# Patient Record
Sex: MALE | Race: BLACK OR AFRICAN AMERICAN | NOT HISPANIC OR LATINO | Employment: FULL TIME | URBAN - METROPOLITAN AREA
[De-identification: names, ages, dates, MRNs, and addresses within clinical notes are randomized per-mention and may not be internally consistent; named-entity substitution may affect disease eponyms.]

---

## 2018-02-17 ENCOUNTER — HOSPITAL ENCOUNTER (EMERGENCY)
Facility: HOSPITAL | Age: 43
Discharge: HOME | End: 2018-02-17
Attending: EMERGENCY MEDICINE

## 2018-02-17 VITALS
RESPIRATION RATE: 18 BRPM | HEART RATE: 92 BPM | OXYGEN SATURATION: 99 % | DIASTOLIC BLOOD PRESSURE: 104 MMHG | SYSTOLIC BLOOD PRESSURE: 142 MMHG | WEIGHT: 170 LBS | HEIGHT: 68 IN | TEMPERATURE: 99 F | BODY MASS INDEX: 25.76 KG/M2

## 2018-02-17 DIAGNOSIS — B02.9 HERPES ZOSTER WITHOUT COMPLICATION: Primary | ICD-10-CM

## 2018-02-17 DIAGNOSIS — I10 UNCONTROLLED HYPERTENSION: ICD-10-CM

## 2018-02-17 LAB
ANION GAP SERPL CALC-SCNC: 9 MMOL/L
BUN SERPL-MCNC: 10 MG/DL
CALCIUM SERPL-MCNC: 9.7 MG/DL
CHLORIDE SERPL-SCNC: 100 MMOL/L
CO2 SERPL-SCNC: 29 MMOL/L
CREAT SERPL-MCNC: 1.1 MG/DL
EST. GFR  (AFRICAN AMERICAN): >60 ML/MIN/1.73 M^2
EST. GFR  (NON AFRICAN AMERICAN): >60 ML/MIN/1.73 M^2
GLUCOSE SERPL-MCNC: 123 MG/DL
POTASSIUM SERPL-SCNC: 3.4 MMOL/L
SODIUM SERPL-SCNC: 138 MMOL/L

## 2018-02-17 PROCEDURE — 99284 EMERGENCY DEPT VISIT MOD MDM: CPT | Mod: 25

## 2018-02-17 PROCEDURE — 96375 TX/PRO/DX INJ NEW DRUG ADDON: CPT

## 2018-02-17 PROCEDURE — 96376 TX/PRO/DX INJ SAME DRUG ADON: CPT

## 2018-02-17 PROCEDURE — 96374 THER/PROPH/DIAG INJ IV PUSH: CPT

## 2018-02-17 PROCEDURE — 25000003 PHARM REV CODE 250: Performed by: EMERGENCY MEDICINE

## 2018-02-17 PROCEDURE — 80048 BASIC METABOLIC PNL TOTAL CA: CPT

## 2018-02-17 PROCEDURE — 63600175 PHARM REV CODE 636 W HCPCS: Performed by: EMERGENCY MEDICINE

## 2018-02-17 RX ORDER — AMLODIPINE BESYLATE 5 MG/1
10 TABLET ORAL DAILY
Qty: 30 TABLET | Refills: 1 | Status: ON HOLD | OUTPATIENT
Start: 2018-02-17 | End: 2021-03-03 | Stop reason: HOSPADM

## 2018-02-17 RX ORDER — ACYCLOVIR 800 MG/1
800 TABLET ORAL
Qty: 35 TABLET | Refills: 0 | Status: ON HOLD | OUTPATIENT
Start: 2018-02-17 | End: 2021-03-03 | Stop reason: HOSPADM

## 2018-02-17 RX ORDER — HYDRALAZINE HYDROCHLORIDE 20 MG/ML
20 INJECTION INTRAMUSCULAR; INTRAVENOUS
Status: DISCONTINUED | OUTPATIENT
Start: 2018-02-17 | End: 2018-02-17 | Stop reason: HOSPADM

## 2018-02-17 RX ORDER — HYDRALAZINE HYDROCHLORIDE 20 MG/ML
20 INJECTION INTRAMUSCULAR; INTRAVENOUS
Status: COMPLETED | OUTPATIENT
Start: 2018-02-17 | End: 2018-02-17

## 2018-02-17 RX ORDER — VALACYCLOVIR HYDROCHLORIDE 500 MG/1
1000 TABLET, FILM COATED ORAL 2 TIMES DAILY
Status: DISCONTINUED | OUTPATIENT
Start: 2018-02-17 | End: 2018-02-17 | Stop reason: HOSPADM

## 2018-02-17 RX ORDER — AMLODIPINE BESYLATE 5 MG/1
10 TABLET ORAL
Status: COMPLETED | OUTPATIENT
Start: 2018-02-17 | End: 2018-02-17

## 2018-02-17 RX ORDER — VALACYCLOVIR HYDROCHLORIDE 500 MG/1
1000 TABLET, FILM COATED ORAL 2 TIMES DAILY
Status: DISCONTINUED | OUTPATIENT
Start: 2018-02-17 | End: 2018-02-17

## 2018-02-17 RX ORDER — HYDROCODONE BITARTRATE AND ACETAMINOPHEN 5; 325 MG/1; MG/1
1-2 TABLET ORAL EVERY 4 HOURS PRN
Qty: 20 TABLET | Refills: 0 | Status: SHIPPED | OUTPATIENT
Start: 2018-02-17 | End: 2018-02-27

## 2018-02-17 RX ORDER — LABETALOL HYDROCHLORIDE 5 MG/ML
40 INJECTION, SOLUTION INTRAVENOUS
Status: DISCONTINUED | OUTPATIENT
Start: 2018-02-17 | End: 2018-02-17 | Stop reason: HOSPADM

## 2018-02-17 RX ORDER — HYDROMORPHONE HYDROCHLORIDE 2 MG/ML
1 INJECTION, SOLUTION INTRAMUSCULAR; INTRAVENOUS; SUBCUTANEOUS
Status: COMPLETED | OUTPATIENT
Start: 2018-02-17 | End: 2018-02-17

## 2018-02-17 RX ORDER — LABETALOL HYDROCHLORIDE 5 MG/ML
20 INJECTION, SOLUTION INTRAVENOUS
Status: COMPLETED | OUTPATIENT
Start: 2018-02-17 | End: 2018-02-17

## 2018-02-17 RX ORDER — LABETALOL HYDROCHLORIDE 5 MG/ML
20 INJECTION, SOLUTION INTRAVENOUS
Status: DISCONTINUED | OUTPATIENT
Start: 2018-02-17 | End: 2018-02-17

## 2018-02-17 RX ORDER — METOPROLOL TARTRATE 50 MG/1
50 TABLET ORAL
Status: COMPLETED | OUTPATIENT
Start: 2018-02-17 | End: 2018-02-17

## 2018-02-17 RX ORDER — METOPROLOL TARTRATE 100 MG/1
50 TABLET ORAL 2 TIMES DAILY
Qty: 60 TABLET | Refills: 1 | Status: ON HOLD | OUTPATIENT
Start: 2018-02-17 | End: 2021-03-03 | Stop reason: HOSPADM

## 2018-02-17 RX ORDER — HYDRALAZINE HYDROCHLORIDE 20 MG/ML
10 INJECTION INTRAMUSCULAR; INTRAVENOUS
Status: COMPLETED | OUTPATIENT
Start: 2018-02-17 | End: 2018-02-17

## 2018-02-17 RX ORDER — ONDANSETRON 4 MG/1
4 TABLET, ORALLY DISINTEGRATING ORAL
Status: COMPLETED | OUTPATIENT
Start: 2018-02-17 | End: 2018-02-17

## 2018-02-17 RX ADMIN — LABETALOL HYDROCHLORIDE 20 MG: 5 INJECTION INTRAVENOUS at 03:02

## 2018-02-17 RX ADMIN — HYDROMORPHONE HYDROCHLORIDE 1 MG: 2 INJECTION INTRAMUSCULAR; INTRAVENOUS; SUBCUTANEOUS at 01:02

## 2018-02-17 RX ADMIN — VALACYCLOVIR HYDROCHLORIDE 1000 MG: 500 TABLET, FILM COATED ORAL at 02:02

## 2018-02-17 RX ADMIN — METOPROLOL TARTRATE 50 MG: 50 TABLET ORAL at 03:02

## 2018-02-17 RX ADMIN — HYDRALAZINE HYDROCHLORIDE 10 MG: 20 INJECTION INTRAMUSCULAR; INTRAVENOUS at 01:02

## 2018-02-17 RX ADMIN — ONDANSETRON 4 MG: 4 TABLET, ORALLY DISINTEGRATING ORAL at 01:02

## 2018-02-17 RX ADMIN — AMLODIPINE BESYLATE 10 MG: 5 TABLET ORAL at 03:02

## 2018-02-17 RX ADMIN — HYDRALAZINE HYDROCHLORIDE 20 MG: 20 INJECTION INTRAMUSCULAR; INTRAVENOUS at 03:02
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
## 2018-02-17 NOTE — ED NOTES
Patient pressure 180/80 after medication given as ordered.  Will continue to monitor for any changes. Pt. Told to call for any needs.

## 2018-02-17 NOTE — ED NOTES
Patient is having a lot of family Issues.   His sister and her children are living with him and they are agitating his significant other who is in turn calling him up and telling him everything that is happening at  Home while he is here.

## 2018-02-17 NOTE — DISCHARGE INSTRUCTIONS
Please take your blood pressure medicines as directed.  Zovirax 4 your rash.  Return if you get worse or if new problems develop.  Please be careful you are contagious to those around you until your rash is covered with dry crusty scabs.

## 2018-02-17 NOTE — ED NOTES
Is witting him up at this time to discharge to home with new prescriptions for his blood pressure.

## 2018-02-17 NOTE — ED TRIAGE NOTES
Presented to ed with c/o rash and itching, started on his back 2 -3 days ago. After scratching it started spreading to under rt. Axilla and rt. Chest.

## 2019-03-15 ENCOUNTER — OFFICE VISIT (OUTPATIENT)
Dept: URGENT CARE | Facility: CLINIC | Age: 44
End: 2019-03-15
Payer: OTHER MISCELLANEOUS

## 2019-03-15 VITALS
DIASTOLIC BLOOD PRESSURE: 148 MMHG | SYSTOLIC BLOOD PRESSURE: 220 MMHG | HEART RATE: 84 BPM | HEIGHT: 68 IN | TEMPERATURE: 98 F | OXYGEN SATURATION: 98 % | RESPIRATION RATE: 17 BRPM | BODY MASS INDEX: 25.76 KG/M2 | WEIGHT: 170 LBS
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
## 2019-03-15 DIAGNOSIS — M62.838 MUSCLE SPASM: ICD-10-CM

## 2019-03-15 DIAGNOSIS — M54.6 MIDLINE THORACIC BACK PAIN, UNSPECIFIED CHRONICITY: ICD-10-CM

## 2019-03-15 DIAGNOSIS — I16.1 HYPERTENSIVE EMERGENCY: ICD-10-CM

## 2019-03-15 PROCEDURE — 99203 OFFICE O/P NEW LOW 30 MIN: CPT | Mod: S$GLB,,, | Performed by: NURSE PRACTITIONER

## 2019-03-15 PROCEDURE — 99199 UNLISTED SPECIAL SVC PX/RPRT: CPT | Mod: S$GLB,,, | Performed by: NURSE PRACTITIONER

## 2019-03-15 PROCEDURE — 99199 CALL OUT FEE BAL & ASSOCIATES: ICD-10-PCS | Mod: S$GLB,,, | Performed by: NURSE PRACTITIONER

## 2019-03-15 PROCEDURE — 99203 PR OFFICE/OUTPT VISIT, NEW, LEVL III, 30-44 MIN: ICD-10-PCS | Mod: S$GLB,,, | Performed by: NURSE PRACTITIONER

## 2019-03-15 RX ORDER — CLONIDINE HYDROCHLORIDE 0.1 MG/1
0.1 TABLET ORAL
Status: COMPLETED | OUTPATIENT
Start: 2019-03-15 | End: 2019-03-15

## 2019-03-15 RX ORDER — ETODOLAC 400 MG/1
400 TABLET, FILM COATED ORAL 2 TIMES DAILY
Qty: 20 TABLET | Refills: 0 | Status: SHIPPED | OUTPATIENT
Start: 2019-03-15 | End: 2020-03-14

## 2019-03-15 RX ORDER — CYCLOBENZAPRINE HCL 5 MG
5 TABLET ORAL 3 TIMES DAILY PRN
Qty: 20 TABLET | Refills: 0 | Status: SHIPPED | OUTPATIENT
Start: 2019-03-15 | End: 2019-03-19 | Stop reason: SDUPTHER

## 2019-03-15 RX ADMIN — CLONIDINE HYDROCHLORIDE 0.1 MG: 0.1 TABLET ORAL at 05:03

## 2019-03-15 RX ADMIN — CLONIDINE HYDROCHLORIDE 0.1 MG: 0.1 TABLET ORAL at 04:03

## 2019-03-15 NOTE — PATIENT INSTRUCTIONS
Go immediately to the ER for assessment and evaluation of blood pressure.  Your blood pressure is excessively elevated and requires emergency treatment.      Relieving Back Pain  Back pain is a common problem. You can strain back muscles by lifting too much weight or just by moving the wrong way. Back strain can be uncomfortable, even painful. And it can take weeks or months to improve. To help yourself feel better and prevent future back strains, try these tips.  Important Note: Do not give aspirin to children or teens without first discussing it with your healthcare provider.      ? Ice    Ice reduces muscle pain and swelling. It helps most during the first 24 to 48 hours after an injury.  · Wrap an ice pack or a bag of frozen peas in a thin towel. (Never place ice directly on your skin.)  · Place the ice where your back hurts the most.  · Dont ice for more than 20 minutes at a time.  · You can use ice several times a day.  ? Medicines  Over-the-counter pain relievers can include acetaminophen and anti-inflammatory medicines, which includes aspirin or ibuprofen. They can help ease discomfort. Some also reduce swelling.  · Tell your healthcare provider about any medicines you are already taking.  · Take medicines only as directed.  ? Heat  After the first 48 hours, heat can relax sore muscles and improve blood flow.  · Try a warm bath or shower. Or use a heating pad set on low. To prevent a burn, keep a cloth between you and the heating pad.  · Dont use a heating pad for more than 15 minutes at a time. Never sleep on a heating pad.  Date Last Reviewed: 9/1/2015 © 2000-2017 [employer]. [address] [place] [address], [place] [address]. All rights reserved. This information is not intended as a substitute for professional medical care. Always follow your healthcare professional's instructions.      Please drink plenty of fluids.  Please get plenty of rest.  Please return here or go to the Emergency  Department for any concerns or worsening of condition.  If you were prescribed a narcotic medication, do not drive or operate heavy equipment or machinery while taking these medications.  If you were not prescribed an anti-inflammatory medication, and if you do not have any history of stomach/intestinal ulcers, or kidney disease, or are not taking a blood thinner such as Coumadin, Plavix, Pradaxa, Eloquis, or Xaralta for example, it is OK to take over the counter Ibuprofen or Advil or Motrin or Aleve as directed.  Do not take these medications on an empty stomach.  Rest, ice, compression and elevation to the affected joint or limb as needed.  Please follow up with your primary care doctor or specialist as needed.    If you  smoke, please stop smoking.
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
## 2019-03-19 ENCOUNTER — OFFICE VISIT (OUTPATIENT)
Dept: URGENT CARE | Facility: CLINIC | Age: 44
End: 2019-03-19
Payer: OTHER MISCELLANEOUS

## 2019-03-19 VITALS
DIASTOLIC BLOOD PRESSURE: 122 MMHG | SYSTOLIC BLOOD PRESSURE: 217 MMHG | HEART RATE: 73 BPM | RESPIRATION RATE: 18 BRPM | WEIGHT: 170 LBS | OXYGEN SATURATION: 98 % | TEMPERATURE: 98 F | HEIGHT: 68 IN | BODY MASS INDEX: 25.76 KG/M2
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
## 2019-03-19 DIAGNOSIS — M62.838 MUSCLE SPASM: ICD-10-CM

## 2019-03-19 DIAGNOSIS — I16.1 HYPERTENSIVE EMERGENCY: ICD-10-CM

## 2019-03-19 DIAGNOSIS — M54.6 MIDLINE THORACIC BACK PAIN, UNSPECIFIED CHRONICITY: ICD-10-CM

## 2019-03-19 DIAGNOSIS — M54.2 CERVICAL PAIN (NECK): ICD-10-CM

## 2019-03-19 DIAGNOSIS — Z72.0 TOBACCO ABUSE: ICD-10-CM

## 2019-03-19 PROCEDURE — 99214 PR OFFICE/OUTPT VISIT, EST, LEVL IV, 30-39 MIN: ICD-10-PCS | Mod: S$GLB,,, | Performed by: NURSE PRACTITIONER

## 2019-03-19 PROCEDURE — 99214 OFFICE O/P EST MOD 30 MIN: CPT | Mod: S$GLB,,, | Performed by: NURSE PRACTITIONER

## 2019-03-19 RX ORDER — CLONIDINE HYDROCHLORIDE 0.1 MG/1
0.1 TABLET ORAL
Status: COMPLETED | OUTPATIENT
Start: 2019-03-19 | End: 2019-03-19

## 2019-03-19 RX ORDER — CYCLOBENZAPRINE HCL 5 MG
5 TABLET ORAL 3 TIMES DAILY PRN
Qty: 21 TABLET | Refills: 0 | Status: SHIPPED | OUTPATIENT
Start: 2019-03-19 | End: 2019-03-26

## 2019-03-19 RX ADMIN — CLONIDINE HYDROCHLORIDE 0.1 MG: 0.1 TABLET ORAL at 12:03
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
## 2019-03-19 NOTE — PATIENT INSTRUCTIONS
"    It is recommended that you go to the emergency room for further follow-up on her elevated blood pressure as the medication we have given you here has not brought it down.  You are at an increased risk for stroke.    Discharge Instructions for High Blood Pressure (Hypertension)  You have been diagnosed with high blood pressure (also called hypertension). This means the force of blood against your artery walls is too strong. It also means your heart is working hard to move blood. High blood pressure usually has no symptoms, but over time, it can damage your heart, blood vessels, eyes, kidneys, and other organs. With help from your doctor, you can manage your blood pressure and protect your health.  Taking medicine  · Learn to take your own blood pressure. Keep a record of your results. Ask your doctor which readings mean that you need medical attention.  · Take your blood pressure medicine exactly as directed. Dont skip doses. Missing doses can cause your blood pressure to get out of control.  · If you do miss a dose (or doses) check with your healthcare provider about what to do.  · Avoid medicine that contain heart stimulants, including over-the-counter drugs. Check for warnings about high blood pressure on the label. Ask the pharmacist before purchasing something you haven't used before  · Check with your doctor or pharmacist before taking a decongestant. Some decongestants can worsen high blood pressure.  Lifestyle changes  · Maintain a healthy weight. Get help to lose any extra pounds.  · Cut back on salt.  ¨ Limit canned, dried, packaged, and fast foods.  ¨ Dont add salt to your food at the table.  ¨ Season foods with herbs instead of salt when you cook.  ¨ Request no added salt when you go to a restaurant.  ¨ The American Heart Associations (AHA) "ideal" sodium intake recommendation is 1,500 milligrams per day.  However, since American's eat so much salt, the AHA says a positive change can occur by " cutting back to even 2,400 milligrams of sodium a day.   · Follow the DASH (Dietary Approaches to Stop Hypertension) eating plan. This plan recommends vegetables, fruits, whole gains, and other heart healthy foods.  · Begin an exercise program. Ask your doctor how to get started. The American Heart Association recommends aerobic exercise 3 to 4 times a week for an average of 40 minutes at a time, with your doctor's approval. Simple activities like walking or gardening can help.  · Break the smoking habit. Enroll in a stop-smoking program to improve your chances of success. Ask your healthcare provider about programs and medicines to help you stop smoking.  · Limit drinks that contain caffeine (coffee, black or green tea, cola) to 2 per day.  · Never take stimulants such as amphetamines or cocaine; these drugs can be deadly for someone with high blood pressure.  · Control your stress. Learn stress-management techniques.  · Limit alcohol to no more than 1 drink a day for women and 2 drinks a day for men.  Follow-up care  Make a follow-up appointment as directed by our staff.     When to seek medical care  Call your doctor immediately or seek emergency care if you have any of the following:  · Chest pain or shortness of breath (call 911)  · Moderate to severe headache  · Weakness in the muscles of your face, arms, or legs  · Trouble speaking  · Extreme drowsiness  · Confusion  · Fainting or dizziness  · Pulsating or rushing sound in your ears  · Unexplained nosebleed  · Weakness, tingling, or numbness of your face, arms, or legs  · Change in vision  · Blood pressure measured at home that is greater than 180/110   Date Last Reviewed: 4/27/2016  © 4279-5152 [employer]. [address] [place] [address], [place] [address]. All rights reserved. This information is not intended as a substitute for professional medical care. Always follow your healthcare professional's instructions.
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
## 2019-03-26 ENCOUNTER — OFFICE VISIT (OUTPATIENT)
Dept: URGENT CARE | Facility: CLINIC | Age: 44
End: 2019-03-26
Payer: OTHER MISCELLANEOUS

## 2019-03-26 VITALS
DIASTOLIC BLOOD PRESSURE: 102 MMHG | SYSTOLIC BLOOD PRESSURE: 178 MMHG | HEIGHT: 68 IN | WEIGHT: 170 LBS | HEART RATE: 61 BPM | BODY MASS INDEX: 25.76 KG/M2

## 2019-03-26 DIAGNOSIS — M54.6 MIDLINE THORACIC BACK PAIN, UNSPECIFIED CHRONICITY: ICD-10-CM

## 2019-03-26 DIAGNOSIS — M62.838 MUSCLE SPASM: ICD-10-CM
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
## 2019-03-26 DIAGNOSIS — I10 HYPERTENSION, UNSPECIFIED TYPE: ICD-10-CM

## 2019-03-26 DIAGNOSIS — M54.2 CERVICAL PAIN (NECK): ICD-10-CM

## 2019-03-26 DIAGNOSIS — Z72.0 TOBACCO ABUSE: ICD-10-CM
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
## 2019-03-26 PROCEDURE — 99214 OFFICE O/P EST MOD 30 MIN: CPT | Mod: S$GLB,,, | Performed by: NURSE PRACTITIONER

## 2019-03-26 PROCEDURE — 99214 PR OFFICE/OUTPT VISIT, EST, LEVL IV, 30-39 MIN: ICD-10-PCS | Mod: S$GLB,,, | Performed by: NURSE PRACTITIONER

## 2019-03-26 RX ORDER — DEXTROMETHORPHAN HYDROBROMIDE, GUAIFENESIN 5; 100 MG/5ML; MG/5ML
650 LIQUID ORAL EVERY 8 HOURS
Refills: 0 | Status: ON HOLD | COMMUNITY
Start: 2019-03-26 | End: 2021-03-03 | Stop reason: HOSPADM
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
## 2021-02-27 ENCOUNTER — HOSPITAL ENCOUNTER (INPATIENT)
Facility: HOSPITAL | Age: 46
LOS: 4 days | Discharge: HOME | DRG: 305 | End: 2021-03-03
Attending: EMERGENCY MEDICINE | Admitting: HOSPITALIST
Payer: MEDICAID

## 2021-02-27 DIAGNOSIS — I16.1 HYPERTENSIVE EMERGENCY: Primary | ICD-10-CM

## 2021-02-27 DIAGNOSIS — R47.81 SLURRED SPEECH: ICD-10-CM

## 2021-02-27 DIAGNOSIS — I16.0 HYPERTENSIVE URGENCY, MALIGNANT: ICD-10-CM

## 2021-02-27 DIAGNOSIS — R79.89 ELEVATED TROPONIN: ICD-10-CM

## 2021-02-27 DIAGNOSIS — R51.9 NONINTRACTABLE HEADACHE, UNSPECIFIED CHRONICITY PATTERN, UNSPECIFIED HEADACHE TYPE: ICD-10-CM

## 2021-02-27 PROBLEM — E87.6 HYPOKALEMIA: Status: ACTIVE | Noted: 2021-02-27

## 2021-02-27 PROBLEM — N17.9 AKI (ACUTE KIDNEY INJURY): Status: ACTIVE | Noted: 2021-02-27

## 2021-02-27 PROBLEM — R17 SERUM TOTAL BILIRUBIN ELEVATED: Status: ACTIVE | Noted: 2021-02-27

## 2021-02-27 LAB
ALBUMIN SERPL BCP-MCNC: 4.6 G/DL (ref 3.5–5.2)
ALP SERPL-CCNC: 125 U/L (ref 55–135)
ALT SERPL W/O P-5'-P-CCNC: 17 U/L (ref 10–44)
AMPHET+METHAMPHET UR QL: NEGATIVE
ANION GAP SERPL CALC-SCNC: 12 MMOL/L (ref 8–16)
AST SERPL-CCNC: 17 U/L (ref 10–40)
BACTERIA #/AREA URNS HPF: NORMAL /HPF
BARBITURATES UR QL SCN>200 NG/ML: NEGATIVE
BASOPHILS NFR BLD: 0 % (ref 0–1.9)
BENZODIAZ UR QL SCN>200 NG/ML: NEGATIVE
BILIRUB SERPL-MCNC: 1.6 MG/DL (ref 0.1–1)
BILIRUB UR QL STRIP: NEGATIVE
BUN SERPL-MCNC: 13 MG/DL (ref 6–20)
BZE UR QL SCN: NEGATIVE
CALCIUM SERPL-MCNC: 9.4 MG/DL (ref 8.7–10.5)
CANNABINOIDS UR QL SCN: NORMAL
CHLORIDE SERPL-SCNC: 98 MMOL/L (ref 95–110)
CLARITY UR: CLEAR
CO2 SERPL-SCNC: 27 MMOL/L (ref 23–29)
COLOR UR: YELLOW
CREAT SERPL-MCNC: 1.9 MG/DL (ref 0.5–1.4)
CREAT UR-MCNC: 91.2 MG/DL (ref 23–375)
CTP QC/QA: YES
DIFFERENTIAL METHOD: NORMAL
EOSINOPHIL NFR BLD: 1 % (ref 0–8)
ERYTHROCYTE [DISTWIDTH] IN BLOOD BY AUTOMATED COUNT: 13.2 % (ref 11.5–14.5)
EST. GFR  (AFRICAN AMERICAN): 48 ML/MIN/1.73 M^2
EST. GFR  (NON AFRICAN AMERICAN): 42 ML/MIN/1.73 M^2
GLUCOSE SERPL-MCNC: 124 MG/DL (ref 70–110)
GLUCOSE UR QL STRIP: NEGATIVE
HCT VFR BLD AUTO: 41.5 % (ref 40–54)
HGB BLD-MCNC: 14.4 G/DL (ref 14–18)
HGB UR QL STRIP: ABNORMAL
HYALINE CASTS #/AREA URNS LPF: 1 /LPF
IMM GRANULOCYTES # BLD AUTO: NORMAL K/UL (ref 0–0.04)
IMM GRANULOCYTES NFR BLD AUTO: NORMAL % (ref 0–0.5)
KETONES UR QL STRIP: NEGATIVE
LEUKOCYTE ESTERASE UR QL STRIP: NEGATIVE
LYMPHOCYTES NFR BLD: 22 % (ref 18–48)
MAGNESIUM SERPL-MCNC: 1.9 MG/DL (ref 1.6–2.6)
MCH RBC QN AUTO: 28.9 PG (ref 27–31)
MCHC RBC AUTO-ENTMCNC: 34.7 G/DL (ref 32–36)
MCV RBC AUTO: 83 FL (ref 82–98)
METHADONE UR QL SCN>300 NG/ML: NEGATIVE
MICROSCOPIC COMMENT: NORMAL
MONOCYTES NFR BLD: 5 % (ref 4–15)
NEUTROPHILS NFR BLD: 72 % (ref 38–73)
NITRITE UR QL STRIP: NEGATIVE
NON-SQ EPI CELLS #/AREA URNS HPF: 0 /HPF
NRBC BLD-RTO: 0 /100 WBC
OPIATES UR QL SCN: NEGATIVE
PCP UR QL SCN>25 NG/ML: NEGATIVE
PH UR STRIP: 7 [PH] (ref 5–8)
PLATELET # BLD AUTO: 183 K/UL (ref 150–350)
PLATELET BLD QL SMEAR: NORMAL
PMV BLD AUTO: 10.2 FL (ref 9.2–12.9)
POCT GLUCOSE: 121 MG/DL (ref 70–110)
POTASSIUM SERPL-SCNC: 3.1 MMOL/L (ref 3.5–5.1)
PROT SERPL-MCNC: 8.4 G/DL (ref 6–8.4)
PROT UR QL STRIP: ABNORMAL
RBC # BLD AUTO: 4.99 M/UL (ref 4.6–6.2)
RBC #/AREA URNS HPF: 3 /HPF (ref 0–4)
SARS-COV-2 RDRP RESP QL NAA+PROBE: NEGATIVE
SODIUM SERPL-SCNC: 137 MMOL/L (ref 136–145)
SP GR UR STRIP: 1.01 (ref 1–1.03)
TOXICOLOGY INFORMATION: NORMAL
TROPONIN I SERPL DL<=0.01 NG/ML-MCNC: 0.04 NG/ML (ref 0–0.03)
URN SPEC COLLECT METH UR: ABNORMAL
UROBILINOGEN UR STRIP-ACNC: NEGATIVE EU/DL
WBC # BLD AUTO: 5.89 K/UL (ref 3.9–12.7)
WBC #/AREA URNS HPF: 0 /HPF (ref 0–5)

## 2021-02-27 PROCEDURE — 93010 ELECTROCARDIOGRAM REPORT: CPT | Mod: ,,, | Performed by: INTERNAL MEDICINE

## 2021-02-27 PROCEDURE — 83935 ASSAY OF URINE OSMOLALITY: CPT

## 2021-02-27 PROCEDURE — 85007 BL SMEAR W/DIFF WBC COUNT: CPT

## 2021-02-27 PROCEDURE — 84300 ASSAY OF URINE SODIUM: CPT

## 2021-02-27 PROCEDURE — 84156 ASSAY OF PROTEIN URINE: CPT

## 2021-02-27 PROCEDURE — 84484 ASSAY OF TROPONIN QUANT: CPT

## 2021-02-27 PROCEDURE — 12000002 HC ACUTE/MED SURGE SEMI-PRIVATE ROOM

## 2021-02-27 PROCEDURE — 25000003 PHARM REV CODE 250: Performed by: EMERGENCY MEDICINE

## 2021-02-27 PROCEDURE — 80053 COMPREHEN METABOLIC PANEL: CPT

## 2021-02-27 PROCEDURE — 81000 URINALYSIS NONAUTO W/SCOPE: CPT | Mod: 59

## 2021-02-27 PROCEDURE — 85027 COMPLETE CBC AUTOMATED: CPT

## 2021-02-27 PROCEDURE — 83735 ASSAY OF MAGNESIUM: CPT

## 2021-02-27 PROCEDURE — 63600175 PHARM REV CODE 636 W HCPCS: Performed by: EMERGENCY MEDICINE

## 2021-02-27 PROCEDURE — 96374 THER/PROPH/DIAG INJ IV PUSH: CPT

## 2021-02-27 PROCEDURE — 99291 CRITICAL CARE FIRST HOUR: CPT | Mod: 25

## 2021-02-27 PROCEDURE — U0002 COVID-19 LAB TEST NON-CDC: HCPCS | Performed by: EMERGENCY MEDICINE

## 2021-02-27 PROCEDURE — 93010 EKG 12-LEAD: ICD-10-PCS | Mod: ,,, | Performed by: INTERNAL MEDICINE

## 2021-02-27 PROCEDURE — 80307 DRUG TEST PRSMV CHEM ANLYZR: CPT

## 2021-02-27 PROCEDURE — 93005 ELECTROCARDIOGRAM TRACING: CPT

## 2021-02-27 RX ORDER — HYDRALAZINE HYDROCHLORIDE 25 MG/1
25 TABLET, FILM COATED ORAL
Status: COMPLETED | OUTPATIENT
Start: 2021-02-27 | End: 2021-02-27

## 2021-02-27 RX ORDER — AMLODIPINE BESYLATE 5 MG/1
10 TABLET ORAL
Status: COMPLETED | OUTPATIENT
Start: 2021-02-27 | End: 2021-02-27

## 2021-02-27 RX ORDER — HYDRALAZINE HYDROCHLORIDE 20 MG/ML
10 INJECTION INTRAMUSCULAR; INTRAVENOUS
Status: COMPLETED | OUTPATIENT
Start: 2021-02-27 | End: 2021-02-27

## 2021-02-27 RX ORDER — NAPROXEN SODIUM 220 MG/1
81 TABLET, FILM COATED ORAL
Status: COMPLETED | OUTPATIENT
Start: 2021-02-27 | End: 2021-02-27

## 2021-02-27 RX ORDER — ACETAMINOPHEN 325 MG/1
650 TABLET ORAL EVERY 6 HOURS PRN
Status: DISCONTINUED | OUTPATIENT
Start: 2021-02-28 | End: 2021-03-03 | Stop reason: HOSPADM

## 2021-02-27 RX ORDER — NICARDIPINE HYDROCHLORIDE 0.2 MG/ML
2.5 INJECTION INTRAVENOUS CONTINUOUS
Status: DISCONTINUED | OUTPATIENT
Start: 2021-02-27 | End: 2021-02-28

## 2021-02-27 RX ADMIN — HYDRALAZINE HYDROCHLORIDE 25 MG: 25 TABLET, FILM COATED ORAL at 08:02

## 2021-02-27 RX ADMIN — POTASSIUM BICARBONATE 25 MEQ: 977.5 TABLET, EFFERVESCENT ORAL at 10:02

## 2021-02-27 RX ADMIN — NICARDIPINE HYDROCHLORIDE 2.5 MG/HR: 0.2 INJECTION, SOLUTION INTRAVENOUS at 11:02

## 2021-02-27 RX ADMIN — AMLODIPINE BESYLATE 10 MG: 5 TABLET ORAL at 08:02

## 2021-02-27 RX ADMIN — ASPIRIN 81 MG: 81 TABLET, CHEWABLE ORAL at 10:02

## 2021-02-27 RX ADMIN — HYDRALAZINE HYDROCHLORIDE 10 MG: 20 INJECTION, SOLUTION INTRAMUSCULAR; INTRAVENOUS at 10:02

## 2021-02-27 RX ADMIN — HYDRALAZINE HYDROCHLORIDE 25 MG: 25 TABLET, FILM COATED ORAL at 10:02

## 2021-02-28 PROBLEM — R79.89 ELEVATED TROPONIN: Status: ACTIVE | Noted: 2021-02-28

## 2021-02-28 PROBLEM — E78.5 DYSLIPIDEMIA: Status: ACTIVE | Noted: 2021-02-28

## 2021-02-28 PROBLEM — Z91.148 NONADHERENCE TO MEDICATION: Status: ACTIVE | Noted: 2021-02-28

## 2021-02-28 PROBLEM — R51.9 NONINTRACTABLE HEADACHE: Status: ACTIVE | Noted: 2021-02-28

## 2021-02-28 LAB
ANION GAP SERPL CALC-SCNC: 13 MMOL/L (ref 8–16)
AORTIC ROOT ANNULUS: 3.33 CM
AORTIC VALVE CUSP SEPERATION: 2.6 CM
APTT BLDCRRT: 25.3 SEC (ref 21–32)
ASCENDING AORTA: 2.58 CM
AV INDEX (PROSTH): 0.65
AV MEAN GRADIENT: 5 MMHG
AV PEAK GRADIENT: 7 MMHG
AV VALVE AREA: 2.69 CM2
AV VELOCITY RATIO: 0.68
BASOPHILS # BLD AUTO: 0.04 K/UL (ref 0–0.2)
BASOPHILS NFR BLD: 0.3 % (ref 0–1.9)
BSA FOR ECHO PROCEDURE: 1.82 M2
BUN SERPL-MCNC: 14 MG/DL (ref 6–20)
CALCIUM SERPL-MCNC: 9.6 MG/DL (ref 8.7–10.5)
CHLORIDE SERPL-SCNC: 98 MMOL/L (ref 95–110)
CO2 SERPL-SCNC: 27 MMOL/L (ref 23–29)
CREAT SERPL-MCNC: 1.7 MG/DL (ref 0.5–1.4)
CREAT UR-MCNC: 91.2 MG/DL (ref 23–375)
CV ECHO LV RWT: 0.94 CM
DIFFERENTIAL METHOD: ABNORMAL
DOP CALC AO PEAK VEL: 1.31 M/S
DOP CALC AO VTI: 25.34 CM
DOP CALC LVOT AREA: 4.1 CM2
DOP CALC LVOT DIAMETER: 2.29 CM
DOP CALC LVOT PEAK VEL: 0.89 M/S
DOP CALC LVOT STROKE VOLUME: 68.21 CM3
DOP CALCLVOT PEAK VEL VTI: 16.57 CM
E WAVE DECELERATION TIME: 215.39 MSEC
E/A RATIO: 0.62
E/E' RATIO: 14 M/S
ECHO LV POSTERIOR WALL: 1.88 CM (ref 0.6–1.1)
EOSINOPHIL # BLD AUTO: 0 K/UL (ref 0–0.5)
EOSINOPHIL NFR BLD: 0.1 % (ref 0–8)
ERYTHROCYTE [DISTWIDTH] IN BLOOD BY AUTOMATED COUNT: 13.4 % (ref 11.5–14.5)
EST. GFR  (AFRICAN AMERICAN): 55 ML/MIN/1.73 M^2
EST. GFR  (NON AFRICAN AMERICAN): 48 ML/MIN/1.73 M^2
FRACTIONAL SHORTENING: 27 % (ref 28–44)
GLUCOSE SERPL-MCNC: 109 MG/DL (ref 70–110)
HCT VFR BLD AUTO: 40.3 % (ref 40–54)
HGB BLD-MCNC: 14.1 G/DL (ref 14–18)
IMM GRANULOCYTES # BLD AUTO: 0.04 K/UL (ref 0–0.04)
IMM GRANULOCYTES NFR BLD AUTO: 0.3 % (ref 0–0.5)
INR PPP: 1 (ref 0.8–1.2)
INTERVENTRICULAR SEPTUM: 1.82 CM (ref 0.6–1.1)
IVRT: 216.94 MSEC
LA MAJOR: 5.83 CM
LA MINOR: 5.42 CM
LA WIDTH: 3.35 CM
LEFT ATRIUM SIZE: 3.28 CM
LEFT ATRIUM VOLUME INDEX: 29 ML/M2
LEFT ATRIUM VOLUME: 52.47 CM3
LEFT INTERNAL DIMENSION IN SYSTOLE: 2.91 CM (ref 2.1–4)
LEFT VENTRICLE DIASTOLIC VOLUME INDEX: 38.58 ML/M2
LEFT VENTRICLE DIASTOLIC VOLUME: 69.83 ML
LEFT VENTRICLE MASS INDEX: 181 G/M2
LEFT VENTRICLE SYSTOLIC VOLUME INDEX: 18 ML/M2
LEFT VENTRICLE SYSTOLIC VOLUME: 32.55 ML
LEFT VENTRICULAR INTERNAL DIMENSION IN DIASTOLE: 4 CM (ref 3.5–6)
LEFT VENTRICULAR MASS: 327.19 G
LV LATERAL E/E' RATIO: 14 M/S
LV SEPTAL E/E' RATIO: 14 M/S
LYMPHOCYTES # BLD AUTO: 1.2 K/UL (ref 1–4.8)
LYMPHOCYTES NFR BLD: 9.7 % (ref 18–48)
MCH RBC QN AUTO: 29 PG (ref 27–31)
MCHC RBC AUTO-ENTMCNC: 35 G/DL (ref 32–36)
MCV RBC AUTO: 83 FL (ref 82–98)
MONOCYTES # BLD AUTO: 0.6 K/UL (ref 0.3–1)
MONOCYTES NFR BLD: 5.2 % (ref 4–15)
MV PEAK A VEL: 0.91 M/S
MV PEAK E VEL: 0.56 M/S
MV STENOSIS PRESSURE HALF TIME: 62.46 MS
MV VALVE AREA P 1/2 METHOD: 3.52 CM2
NEUTROPHILS # BLD AUTO: 10.1 K/UL (ref 1.8–7.7)
NEUTROPHILS NFR BLD: 84.4 % (ref 38–73)
NRBC BLD-RTO: 0 /100 WBC
PLATELET # BLD AUTO: 178 K/UL (ref 150–350)
PMV BLD AUTO: 10 FL (ref 9.2–12.9)
POCT GLUCOSE: 96 MG/DL (ref 70–110)
POTASSIUM SERPL-SCNC: 3.6 MMOL/L (ref 3.5–5.1)
PROT UR-MCNC: 307 MG/DL
PROT/CREAT UR: 3.37 MG/G{CREAT} (ref 0–0.2)
PROTHROMBIN TIME: 10.7 SEC (ref 9–12.5)
PULM VEIN S/D RATIO: 2.12
PV PEAK D VEL: 0.25 M/S
PV PEAK S VEL: 0.53 M/S
PV PEAK VELOCITY: 1.06 CM/S
RA MAJOR: 4.81 CM
RA PRESSURE: 3 MMHG
RA WIDTH: 3.64 CM
RBC # BLD AUTO: 4.87 M/UL (ref 4.6–6.2)
RIGHT VENTRICULAR END-DIASTOLIC DIMENSION: 3.16 CM
RV TISSUE DOPPLER FREE WALL SYSTOLIC VELOCITY 1 (APICAL 4 CHAMBER VIEW): 14.57 CM/S
SINUS: 3.1 CM
SODIUM SERPL-SCNC: 138 MMOL/L (ref 136–145)
SODIUM UR-SCNC: 68 MMOL/L (ref 20–250)
STJ: 2.38 CM
TDI LATERAL: 0.04 M/S
TDI SEPTAL: 0.04 M/S
TDI: 0.04 M/S
TRICUSPID ANNULAR PLANE SYSTOLIC EXCURSION: 1.61 CM
TROPONIN I SERPL DL<=0.01 NG/ML-MCNC: 0.47 NG/ML (ref 0–0.03)
TROPONIN I SERPL DL<=0.01 NG/ML-MCNC: 1.56 NG/ML (ref 0–0.03)
WBC # BLD AUTO: 11.92 K/UL (ref 3.9–12.7)

## 2021-02-28 PROCEDURE — 99291 PR CRITICAL CARE, E/M 30-74 MINUTES: ICD-10-PCS | Mod: 25,,, | Performed by: INTERNAL MEDICINE

## 2021-02-28 PROCEDURE — 84484 ASSAY OF TROPONIN QUANT: CPT

## 2021-02-28 PROCEDURE — 63600175 PHARM REV CODE 636 W HCPCS: Performed by: HOSPITALIST

## 2021-02-28 PROCEDURE — 25000003 PHARM REV CODE 250: Performed by: INTERNAL MEDICINE

## 2021-02-28 PROCEDURE — 94761 N-INVAS EAR/PLS OXIMETRY MLT: CPT

## 2021-02-28 PROCEDURE — 25000003 PHARM REV CODE 250: Performed by: HOSPITALIST

## 2021-02-28 PROCEDURE — 99232 SBSQ HOSP IP/OBS MODERATE 35: CPT | Mod: 95,,, | Performed by: PSYCHIATRY & NEUROLOGY

## 2021-02-28 PROCEDURE — 85730 THROMBOPLASTIN TIME PARTIAL: CPT

## 2021-02-28 PROCEDURE — 63600175 PHARM REV CODE 636 W HCPCS: Performed by: PHYSICIAN ASSISTANT

## 2021-02-28 PROCEDURE — 11000001 HC ACUTE MED/SURG PRIVATE ROOM

## 2021-02-28 PROCEDURE — 85025 COMPLETE CBC W/AUTO DIFF WBC: CPT

## 2021-02-28 PROCEDURE — 80048 BASIC METABOLIC PNL TOTAL CA: CPT

## 2021-02-28 PROCEDURE — 85610 PROTHROMBIN TIME: CPT

## 2021-02-28 PROCEDURE — 99291 CRITICAL CARE FIRST HOUR: CPT | Mod: 25,,, | Performed by: INTERNAL MEDICINE

## 2021-02-28 PROCEDURE — 99232 PR SUBSEQUENT HOSPITAL CARE,LEVL II: ICD-10-PCS | Mod: 95,,, | Performed by: PSYCHIATRY & NEUROLOGY

## 2021-02-28 PROCEDURE — 25000003 PHARM REV CODE 250: Performed by: PHYSICIAN ASSISTANT

## 2021-02-28 PROCEDURE — 36415 COLL VENOUS BLD VENIPUNCTURE: CPT

## 2021-02-28 RX ORDER — NICARDIPINE HYDROCHLORIDE 0.2 MG/ML
0-15 INJECTION INTRAVENOUS CONTINUOUS
Status: DISCONTINUED | OUTPATIENT
Start: 2021-02-28 | End: 2021-02-28

## 2021-02-28 RX ORDER — NAPROXEN SODIUM 220 MG/1
81 TABLET, FILM COATED ORAL DAILY
Status: DISCONTINUED | OUTPATIENT
Start: 2021-02-28 | End: 2021-03-03 | Stop reason: HOSPADM

## 2021-02-28 RX ORDER — AMLODIPINE BESYLATE 5 MG/1
10 TABLET ORAL DAILY
Status: DISCONTINUED | OUTPATIENT
Start: 2021-02-28 | End: 2021-03-03 | Stop reason: HOSPADM

## 2021-02-28 RX ORDER — ONDANSETRON 2 MG/ML
8 INJECTION INTRAMUSCULAR; INTRAVENOUS EVERY 6 HOURS PRN
Status: DISCONTINUED | OUTPATIENT
Start: 2021-02-28 | End: 2021-03-03 | Stop reason: HOSPADM

## 2021-02-28 RX ORDER — METOPROLOL TARTRATE 50 MG/1
100 TABLET ORAL 2 TIMES DAILY
Status: DISCONTINUED | OUTPATIENT
Start: 2021-02-28 | End: 2021-03-03 | Stop reason: HOSPADM

## 2021-02-28 RX ORDER — SODIUM CHLORIDE 0.9 % (FLUSH) 0.9 %
10 SYRINGE (ML) INJECTION
Status: DISCONTINUED | OUTPATIENT
Start: 2021-02-28 | End: 2021-03-03 | Stop reason: HOSPADM

## 2021-02-28 RX ORDER — ATORVASTATIN CALCIUM 40 MG/1
40 TABLET, FILM COATED ORAL NIGHTLY
Status: DISCONTINUED | OUTPATIENT
Start: 2021-02-28 | End: 2021-03-03 | Stop reason: HOSPADM

## 2021-02-28 RX ORDER — TRAMADOL HYDROCHLORIDE 50 MG/1
50 TABLET ORAL ONCE
Status: DISCONTINUED | OUTPATIENT
Start: 2021-02-28 | End: 2021-03-03 | Stop reason: HOSPADM

## 2021-02-28 RX ORDER — HEPARIN SODIUM 5000 [USP'U]/ML
5000 INJECTION, SOLUTION INTRAVENOUS; SUBCUTANEOUS EVERY 8 HOURS
Status: DISCONTINUED | OUTPATIENT
Start: 2021-02-28 | End: 2021-02-28

## 2021-02-28 RX ORDER — METOPROLOL TARTRATE 50 MG/1
100 TABLET ORAL 2 TIMES DAILY
Status: DISCONTINUED | OUTPATIENT
Start: 2021-02-28 | End: 2021-02-28

## 2021-02-28 RX ORDER — MUPIROCIN 20 MG/G
OINTMENT TOPICAL 2 TIMES DAILY
Status: DISCONTINUED | OUTPATIENT
Start: 2021-02-28 | End: 2021-03-03 | Stop reason: HOSPADM

## 2021-02-28 RX ORDER — CLONIDINE HYDROCHLORIDE 0.1 MG/1
0.1 TABLET ORAL EVERY 4 HOURS PRN
Status: DISCONTINUED | OUTPATIENT
Start: 2021-02-28 | End: 2021-02-28

## 2021-02-28 RX ORDER — HEPARIN SODIUM,PORCINE/D5W 25000/250
0-40 INTRAVENOUS SOLUTION INTRAVENOUS CONTINUOUS
Status: DISCONTINUED | OUTPATIENT
Start: 2021-02-28 | End: 2021-02-28

## 2021-02-28 RX ORDER — CLONIDINE HYDROCHLORIDE 0.1 MG/1
0.1 TABLET ORAL EVERY 4 HOURS PRN
Status: DISCONTINUED | OUTPATIENT
Start: 2021-02-28 | End: 2021-03-03 | Stop reason: HOSPADM

## 2021-02-28 RX ORDER — METOPROLOL TARTRATE 50 MG/1
200 TABLET ORAL 2 TIMES DAILY
Status: DISCONTINUED | OUTPATIENT
Start: 2021-02-28 | End: 2021-02-28

## 2021-02-28 RX ORDER — METOPROLOL TARTRATE 50 MG/1
100 TABLET ORAL ONCE
Status: DISCONTINUED | OUTPATIENT
Start: 2021-02-28 | End: 2021-02-28

## 2021-02-28 RX ORDER — HEPARIN SODIUM 5000 [USP'U]/ML
5000 INJECTION, SOLUTION INTRAVENOUS; SUBCUTANEOUS EVERY 8 HOURS
Status: DISCONTINUED | OUTPATIENT
Start: 2021-02-28 | End: 2021-03-03 | Stop reason: HOSPADM

## 2021-02-28 RX ADMIN — POTASSIUM BICARBONATE 25 MEQ: 978 TABLET, EFFERVESCENT ORAL at 01:02

## 2021-02-28 RX ADMIN — ACETAMINOPHEN 650 MG: 325 TABLET ORAL at 03:02

## 2021-02-28 RX ADMIN — HEPARIN SODIUM 5000 UNITS: 5000 INJECTION INTRAVENOUS; SUBCUTANEOUS at 05:02

## 2021-02-28 RX ADMIN — NICARDIPINE HYDROCHLORIDE 2.5 MG/HR: 0.2 INJECTION, SOLUTION INTRAVENOUS at 01:02

## 2021-02-28 RX ADMIN — ONDANSETRON 8 MG: 2 INJECTION INTRAMUSCULAR; INTRAVENOUS at 01:02

## 2021-02-28 RX ADMIN — HEPARIN SODIUM 5000 UNITS: 5000 INJECTION INTRAVENOUS; SUBCUTANEOUS at 08:02

## 2021-02-28 RX ADMIN — METOPROLOL TARTRATE 100 MG: 50 TABLET, FILM COATED ORAL at 09:02

## 2021-02-28 RX ADMIN — ACETAMINOPHEN 650 MG: 325 TABLET ORAL at 06:02

## 2021-02-28 RX ADMIN — ATORVASTATIN CALCIUM 40 MG: 40 TABLET, FILM COATED ORAL at 08:02

## 2021-02-28 RX ADMIN — CLONIDINE HYDROCHLORIDE 0.1 MG: 0.1 TABLET ORAL at 11:02

## 2021-02-28 RX ADMIN — METOPROLOL TARTRATE 100 MG: 50 TABLET, FILM COATED ORAL at 08:02

## 2021-02-28 RX ADMIN — AMLODIPINE BESYLATE 10 MG: 5 TABLET ORAL at 10:02

## 2021-02-28 RX ADMIN — METOPROLOL TARTRATE 100 MG: 50 TABLET, FILM COATED ORAL at 02:02

## 2021-02-28 RX ADMIN — MUPIROCIN: 20 OINTMENT TOPICAL at 10:02

## 2021-02-28 RX ADMIN — MUPIROCIN: 20 OINTMENT TOPICAL at 08:02

## 2021-02-28 RX ADMIN — ACETAMINOPHEN 650 MG: 325 TABLET ORAL at 12:02

## 2021-02-28 RX ADMIN — ASPIRIN 81 MG: 81 TABLET, CHEWABLE ORAL at 09:02

## 2021-02-28 RX ADMIN — ATORVASTATIN CALCIUM 40 MG: 40 TABLET, FILM COATED ORAL at 02:02

## 2021-02-28 RX ADMIN — HEPARIN SODIUM 5000 UNITS: 5000 INJECTION INTRAVENOUS; SUBCUTANEOUS at 02:02

## 2021-03-01 PROBLEM — R47.81 SLURRED SPEECH: Status: ACTIVE | Noted: 2021-03-01

## 2021-03-01 PROBLEM — I16.1 HYPERTENSIVE EMERGENCY: Status: ACTIVE | Noted: 2021-02-27

## 2021-03-01 LAB
ANION GAP SERPL CALC-SCNC: 12 MMOL/L (ref 8–16)
BASOPHILS # BLD AUTO: 0.03 K/UL (ref 0–0.2)
BASOPHILS NFR BLD: 0.5 % (ref 0–1.9)
BUN SERPL-MCNC: 29 MG/DL (ref 6–20)
CALCIUM SERPL-MCNC: 9.2 MG/DL (ref 8.7–10.5)
CHLORIDE SERPL-SCNC: 95 MMOL/L (ref 95–110)
CO2 SERPL-SCNC: 29 MMOL/L (ref 23–29)
CREAT SERPL-MCNC: 2.7 MG/DL (ref 0.5–1.4)
CREAT UR-MCNC: 240 MG/DL (ref 23–375)
CV STRESS BASE HR: 60 BPM
DIASTOLIC BLOOD PRESSURE: 99 MMHG
DIFFERENTIAL METHOD: ABNORMAL
EOSINOPHIL # BLD AUTO: 0.1 K/UL (ref 0–0.5)
EOSINOPHIL NFR BLD: 1.2 % (ref 0–8)
EOSINOPHIL URNS QL WRIGHT STN: NORMAL
ERYTHROCYTE [DISTWIDTH] IN BLOOD BY AUTOMATED COUNT: 13.9 % (ref 11.5–14.5)
EST. GFR  (AFRICAN AMERICAN): 31 ML/MIN/1.73 M^2
EST. GFR  (NON AFRICAN AMERICAN): 27 ML/MIN/1.73 M^2
GLUCOSE SERPL-MCNC: 81 MG/DL (ref 70–110)
HCT VFR BLD AUTO: 38.5 % (ref 40–54)
HGB BLD-MCNC: 13.1 G/DL (ref 14–18)
IMM GRANULOCYTES # BLD AUTO: 0.02 K/UL (ref 0–0.04)
IMM GRANULOCYTES NFR BLD AUTO: 0.3 % (ref 0–0.5)
LYMPHOCYTES # BLD AUTO: 2.3 K/UL (ref 1–4.8)
LYMPHOCYTES NFR BLD: 38.5 % (ref 18–48)
MCH RBC QN AUTO: 28.7 PG (ref 27–31)
MCHC RBC AUTO-ENTMCNC: 34 G/DL (ref 32–36)
MCV RBC AUTO: 84 FL (ref 82–98)
MONOCYTES # BLD AUTO: 0.6 K/UL (ref 0.3–1)
MONOCYTES NFR BLD: 9.8 % (ref 4–15)
NEUTROPHILS # BLD AUTO: 2.9 K/UL (ref 1.8–7.7)
NEUTROPHILS NFR BLD: 49.7 % (ref 38–73)
NRBC BLD-RTO: 0 /100 WBC
OHS CV CPX 85 PERCENT MAX PREDICTED HEART RATE MALE: 149
OHS CV CPX MAX PREDICTED HEART RATE: 175
OHS CV CPX PATIENT IS FEMALE: 0
OHS CV CPX PATIENT IS MALE: 1
OHS CV CPX PEAK DIASTOLIC BLOOD PRESSURE: 84 MMHG
OHS CV CPX PEAK HEAR RATE: 91 BPM
OHS CV CPX PEAK RATE PRESSURE PRODUCT: 9919
OHS CV CPX PEAK SYSTOLIC BLOOD PRESSURE: 109 MMHG
OHS CV CPX PERCENT MAX PREDICTED HEART RATE ACHIEVED: 52
OHS CV CPX RATE PRESSURE PRODUCT PRESENTING: 8580
PLATELET # BLD AUTO: 190 K/UL (ref 150–350)
PMV BLD AUTO: 11.7 FL (ref 9.2–12.9)
POTASSIUM SERPL-SCNC: 3.2 MMOL/L (ref 3.5–5.1)
RBC # BLD AUTO: 4.57 M/UL (ref 4.6–6.2)
SODIUM SERPL-SCNC: 136 MMOL/L (ref 136–145)
SODIUM UR-SCNC: <20 MMOL/L (ref 20–250)
SYSTOLIC BLOOD PRESSURE: 143 MMHG
WBC # BLD AUTO: 5.84 K/UL (ref 3.9–12.7)

## 2021-03-01 PROCEDURE — 85025 COMPLETE CBC W/AUTO DIFF WBC: CPT

## 2021-03-01 PROCEDURE — 25000003 PHARM REV CODE 250: Performed by: INTERNAL MEDICINE

## 2021-03-01 PROCEDURE — 25000003 PHARM REV CODE 250: Performed by: HOSPITALIST

## 2021-03-01 PROCEDURE — 63600175 PHARM REV CODE 636 W HCPCS: Performed by: HOSPITALIST

## 2021-03-01 PROCEDURE — 87205 SMEAR GRAM STAIN: CPT

## 2021-03-01 PROCEDURE — 80048 BASIC METABOLIC PNL TOTAL CA: CPT

## 2021-03-01 PROCEDURE — 99222 1ST HOSP IP/OBS MODERATE 55: CPT | Mod: ,,, | Performed by: PSYCHIATRY & NEUROLOGY

## 2021-03-01 PROCEDURE — 36415 COLL VENOUS BLD VENIPUNCTURE: CPT

## 2021-03-01 PROCEDURE — 99233 PR SUBSEQUENT HOSPITAL CARE,LEVL III: ICD-10-PCS | Mod: 25,,, | Performed by: INTERNAL MEDICINE

## 2021-03-01 PROCEDURE — 99233 SBSQ HOSP IP/OBS HIGH 50: CPT | Mod: 25,,, | Performed by: INTERNAL MEDICINE

## 2021-03-01 PROCEDURE — 11000001 HC ACUTE MED/SURG PRIVATE ROOM

## 2021-03-01 PROCEDURE — 83036 HEMOGLOBIN GLYCOSYLATED A1C: CPT

## 2021-03-01 PROCEDURE — 99222 PR INITIAL HOSPITAL CARE,LEVL II: ICD-10-PCS | Mod: ,,, | Performed by: PSYCHIATRY & NEUROLOGY

## 2021-03-01 PROCEDURE — 63600175 PHARM REV CODE 636 W HCPCS: Performed by: INTERNAL MEDICINE

## 2021-03-01 PROCEDURE — 84300 ASSAY OF URINE SODIUM: CPT

## 2021-03-01 PROCEDURE — 82570 ASSAY OF URINE CREATININE: CPT

## 2021-03-01 RX ORDER — SODIUM CHLORIDE 9 MG/ML
INJECTION, SOLUTION INTRAVENOUS CONTINUOUS
Status: DISCONTINUED | OUTPATIENT
Start: 2021-03-01 | End: 2021-03-02

## 2021-03-01 RX ORDER — LORAZEPAM 2 MG/ML
1 INJECTION INTRAMUSCULAR ONCE
Status: DISCONTINUED | OUTPATIENT
Start: 2021-03-01 | End: 2021-03-03 | Stop reason: HOSPADM

## 2021-03-01 RX ORDER — REGADENOSON 0.08 MG/ML
0.4 INJECTION, SOLUTION INTRAVENOUS ONCE
Status: COMPLETED | OUTPATIENT
Start: 2021-03-01 | End: 2021-03-01

## 2021-03-01 RX ORDER — LORAZEPAM 2 MG/ML
1 INJECTION INTRAMUSCULAR ONCE
Status: COMPLETED | OUTPATIENT
Start: 2021-03-01 | End: 2021-03-01

## 2021-03-01 RX ORDER — POTASSIUM CHLORIDE 750 MG/1
30 TABLET, EXTENDED RELEASE ORAL ONCE
Status: COMPLETED | OUTPATIENT
Start: 2021-03-01 | End: 2021-03-01

## 2021-03-01 RX ADMIN — METOPROLOL TARTRATE 100 MG: 50 TABLET, FILM COATED ORAL at 08:03

## 2021-03-01 RX ADMIN — POTASSIUM CHLORIDE 30 MEQ: 750 TABLET, EXTENDED RELEASE ORAL at 04:03

## 2021-03-01 RX ADMIN — REGADENOSON 0.4 MG: 0.08 INJECTION, SOLUTION INTRAVENOUS at 10:03

## 2021-03-01 RX ADMIN — HEPARIN SODIUM 5000 UNITS: 5000 INJECTION INTRAVENOUS; SUBCUTANEOUS at 10:03

## 2021-03-01 RX ADMIN — ACETAMINOPHEN 650 MG: 325 TABLET ORAL at 07:03

## 2021-03-01 RX ADMIN — MUPIROCIN: 20 OINTMENT TOPICAL at 11:03

## 2021-03-01 RX ADMIN — AMLODIPINE BESYLATE 10 MG: 5 TABLET ORAL at 11:03

## 2021-03-01 RX ADMIN — LORAZEPAM 1 MG: 2 INJECTION INTRAMUSCULAR; INTRAVENOUS at 02:03

## 2021-03-01 RX ADMIN — HEPARIN SODIUM 5000 UNITS: 5000 INJECTION INTRAVENOUS; SUBCUTANEOUS at 05:03

## 2021-03-01 RX ADMIN — MUPIROCIN: 20 OINTMENT TOPICAL at 08:03

## 2021-03-01 RX ADMIN — ASPIRIN 81 MG: 81 TABLET, CHEWABLE ORAL at 11:03

## 2021-03-01 RX ADMIN — SODIUM CHLORIDE: 0.9 INJECTION, SOLUTION INTRAVENOUS at 04:03

## 2021-03-01 RX ADMIN — HEPARIN SODIUM 5000 UNITS: 5000 INJECTION INTRAVENOUS; SUBCUTANEOUS at 02:03

## 2021-03-01 RX ADMIN — METOPROLOL TARTRATE 100 MG: 50 TABLET, FILM COATED ORAL at 11:03

## 2021-03-01 RX ADMIN — ATORVASTATIN CALCIUM 40 MG: 40 TABLET, FILM COATED ORAL at 08:03

## 2021-03-02 LAB
ANION GAP SERPL CALC-SCNC: 14 MMOL/L (ref 8–16)
BASOPHILS # BLD AUTO: 0.03 K/UL (ref 0–0.2)
BASOPHILS NFR BLD: 0.6 % (ref 0–1.9)
BUN SERPL-MCNC: 32 MG/DL (ref 6–20)
CALCIUM SERPL-MCNC: 9.1 MG/DL (ref 8.7–10.5)
CHLORIDE SERPL-SCNC: 98 MMOL/L (ref 95–110)
CO2 SERPL-SCNC: 26 MMOL/L (ref 23–29)
CREAT SERPL-MCNC: 2.4 MG/DL (ref 0.5–1.4)
DIFFERENTIAL METHOD: ABNORMAL
EOSINOPHIL # BLD AUTO: 0.1 K/UL (ref 0–0.5)
EOSINOPHIL NFR BLD: 1.4 % (ref 0–8)
ERYTHROCYTE [DISTWIDTH] IN BLOOD BY AUTOMATED COUNT: 14 % (ref 11.5–14.5)
EST. GFR  (AFRICAN AMERICAN): 36 ML/MIN/1.73 M^2
EST. GFR  (NON AFRICAN AMERICAN): 31 ML/MIN/1.73 M^2
ESTIMATED AVG GLUCOSE: 103 MG/DL (ref 68–131)
GLUCOSE SERPL-MCNC: 85 MG/DL (ref 70–110)
HBA1C MFR BLD: 5.2 % (ref 4–5.6)
HCT VFR BLD AUTO: 38.3 % (ref 40–54)
HGB BLD-MCNC: 13 G/DL (ref 14–18)
IMM GRANULOCYTES # BLD AUTO: 0.01 K/UL (ref 0–0.04)
IMM GRANULOCYTES NFR BLD AUTO: 0.2 % (ref 0–0.5)
LYMPHOCYTES # BLD AUTO: 1.3 K/UL (ref 1–4.8)
LYMPHOCYTES NFR BLD: 25 % (ref 18–48)
MCH RBC QN AUTO: 29.1 PG (ref 27–31)
MCHC RBC AUTO-ENTMCNC: 33.9 G/DL (ref 32–36)
MCV RBC AUTO: 86 FL (ref 82–98)
MONOCYTES # BLD AUTO: 0.7 K/UL (ref 0.3–1)
MONOCYTES NFR BLD: 13.9 % (ref 4–15)
NEUTROPHILS # BLD AUTO: 3 K/UL (ref 1.8–7.7)
NEUTROPHILS NFR BLD: 58.9 % (ref 38–73)
NRBC BLD-RTO: 0 /100 WBC
PLATELET # BLD AUTO: 179 K/UL (ref 150–350)
PMV BLD AUTO: 11.2 FL (ref 9.2–12.9)
POTASSIUM SERPL-SCNC: 3.7 MMOL/L (ref 3.5–5.1)
RBC # BLD AUTO: 4.47 M/UL (ref 4.6–6.2)
SODIUM SERPL-SCNC: 138 MMOL/L (ref 136–145)
WBC # BLD AUTO: 5.12 K/UL (ref 3.9–12.7)

## 2021-03-02 PROCEDURE — 85025 COMPLETE CBC W/AUTO DIFF WBC: CPT

## 2021-03-02 PROCEDURE — 25000003 PHARM REV CODE 250: Performed by: HOSPITALIST

## 2021-03-02 PROCEDURE — 36415 COLL VENOUS BLD VENIPUNCTURE: CPT

## 2021-03-02 PROCEDURE — 99233 PR SUBSEQUENT HOSPITAL CARE,LEVL III: ICD-10-PCS | Mod: ,,, | Performed by: INTERNAL MEDICINE

## 2021-03-02 PROCEDURE — 92523 SPEECH SOUND LANG COMPREHEN: CPT

## 2021-03-02 PROCEDURE — 97116 GAIT TRAINING THERAPY: CPT

## 2021-03-02 PROCEDURE — 99233 SBSQ HOSP IP/OBS HIGH 50: CPT | Mod: ,,, | Performed by: INTERNAL MEDICINE

## 2021-03-02 PROCEDURE — 63600175 PHARM REV CODE 636 W HCPCS: Performed by: HOSPITALIST

## 2021-03-02 PROCEDURE — 11000001 HC ACUTE MED/SURG PRIVATE ROOM

## 2021-03-02 PROCEDURE — 92610 EVALUATE SWALLOWING FUNCTION: CPT

## 2021-03-02 PROCEDURE — 99232 PR SUBSEQUENT HOSPITAL CARE,LEVL II: ICD-10-PCS | Mod: ,,, | Performed by: PSYCHIATRY & NEUROLOGY

## 2021-03-02 PROCEDURE — 99232 SBSQ HOSP IP/OBS MODERATE 35: CPT | Mod: ,,, | Performed by: PSYCHIATRY & NEUROLOGY

## 2021-03-02 PROCEDURE — 25000003 PHARM REV CODE 250: Performed by: INTERNAL MEDICINE

## 2021-03-02 PROCEDURE — 80048 BASIC METABOLIC PNL TOTAL CA: CPT

## 2021-03-02 PROCEDURE — 97165 OT EVAL LOW COMPLEX 30 MIN: CPT

## 2021-03-02 PROCEDURE — 97161 PT EVAL LOW COMPLEX 20 MIN: CPT

## 2021-03-02 RX ADMIN — METOPROLOL TARTRATE 100 MG: 50 TABLET, FILM COATED ORAL at 08:03

## 2021-03-02 RX ADMIN — SODIUM CHLORIDE: 0.9 INJECTION, SOLUTION INTRAVENOUS at 05:03

## 2021-03-02 RX ADMIN — ATORVASTATIN CALCIUM 40 MG: 40 TABLET, FILM COATED ORAL at 08:03

## 2021-03-02 RX ADMIN — ASPIRIN 81 MG: 81 TABLET, CHEWABLE ORAL at 08:03

## 2021-03-02 RX ADMIN — MUPIROCIN: 20 OINTMENT TOPICAL at 08:03

## 2021-03-02 RX ADMIN — AMLODIPINE BESYLATE 10 MG: 5 TABLET ORAL at 08:03

## 2021-03-02 RX ADMIN — HEPARIN SODIUM 5000 UNITS: 5000 INJECTION INTRAVENOUS; SUBCUTANEOUS at 11:03

## 2021-03-02 RX ADMIN — HEPARIN SODIUM 5000 UNITS: 5000 INJECTION INTRAVENOUS; SUBCUTANEOUS at 05:03

## 2021-03-02 RX ADMIN — HEPARIN SODIUM 5000 UNITS: 5000 INJECTION INTRAVENOUS; SUBCUTANEOUS at 03:03

## 2021-03-03 VITALS
HEART RATE: 61 BPM | BODY MASS INDEX: 24.33 KG/M2 | WEIGHT: 155 LBS | DIASTOLIC BLOOD PRESSURE: 96 MMHG | RESPIRATION RATE: 19 BRPM | SYSTOLIC BLOOD PRESSURE: 159 MMHG | TEMPERATURE: 97 F | OXYGEN SATURATION: 98 % | HEIGHT: 67 IN

## 2021-03-03 LAB
25(OH)D3+25(OH)D2 SERPL-MCNC: 6 NG/ML (ref 30–96)
ANION GAP SERPL CALC-SCNC: 11 MMOL/L (ref 8–16)
BASOPHILS # BLD AUTO: 0.04 K/UL (ref 0–0.2)
BASOPHILS NFR BLD: 0.9 % (ref 0–1.9)
BUN SERPL-MCNC: 22 MG/DL (ref 6–20)
CALCIUM SERPL-MCNC: 9 MG/DL (ref 8.7–10.5)
CHLORIDE SERPL-SCNC: 101 MMOL/L (ref 95–110)
CO2 SERPL-SCNC: 28 MMOL/L (ref 23–29)
CREAT SERPL-MCNC: 1.8 MG/DL (ref 0.5–1.4)
DIFFERENTIAL METHOD: ABNORMAL
EOSINOPHIL # BLD AUTO: 0.2 K/UL (ref 0–0.5)
EOSINOPHIL NFR BLD: 3.5 % (ref 0–8)
ERYTHROCYTE [DISTWIDTH] IN BLOOD BY AUTOMATED COUNT: 13.9 % (ref 11.5–14.5)
EST. GFR  (AFRICAN AMERICAN): 51 ML/MIN/1.73 M^2
EST. GFR  (NON AFRICAN AMERICAN): 44 ML/MIN/1.73 M^2
GLUCOSE SERPL-MCNC: 74 MG/DL (ref 70–110)
HAV IGM SERPL QL IA: NEGATIVE
HBV CORE IGM SERPL QL IA: NEGATIVE
HBV SURFACE AG SERPL QL IA: NEGATIVE
HCT VFR BLD AUTO: 38.5 % (ref 40–54)
HCV AB SERPL QL IA: NEGATIVE
HGB BLD-MCNC: 12.8 G/DL (ref 14–18)
HIV1+2 IGG SERPL QL IA.RAPID: NORMAL
IMM GRANULOCYTES # BLD AUTO: 0.01 K/UL (ref 0–0.04)
IMM GRANULOCYTES NFR BLD AUTO: 0.2 % (ref 0–0.5)
LYMPHOCYTES # BLD AUTO: 1.5 K/UL (ref 1–4.8)
LYMPHOCYTES NFR BLD: 34.2 % (ref 18–48)
MCH RBC QN AUTO: 28.6 PG (ref 27–31)
MCHC RBC AUTO-ENTMCNC: 33.2 G/DL (ref 32–36)
MCV RBC AUTO: 86 FL (ref 82–98)
MONOCYTES # BLD AUTO: 0.4 K/UL (ref 0.3–1)
MONOCYTES NFR BLD: 9.9 % (ref 4–15)
NEUTROPHILS # BLD AUTO: 2.2 K/UL (ref 1.8–7.7)
NEUTROPHILS NFR BLD: 51.3 % (ref 38–73)
NRBC BLD-RTO: 0 /100 WBC
PHOSPHATE SERPL-MCNC: 2.8 MG/DL (ref 2.7–4.5)
PLATELET # BLD AUTO: 194 K/UL (ref 150–350)
PMV BLD AUTO: 11.3 FL (ref 9.2–12.9)
POTASSIUM SERPL-SCNC: 3.7 MMOL/L (ref 3.5–5.1)
PTH-INTACT SERPL-MCNC: 109.9 PG/ML (ref 9–77)
RBC # BLD AUTO: 4.47 M/UL (ref 4.6–6.2)
SODIUM SERPL-SCNC: 140 MMOL/L (ref 136–145)
TSH SERPL DL<=0.005 MIU/L-ACNC: 0.58 UIU/ML (ref 0.4–4)
WBC # BLD AUTO: 4.24 K/UL (ref 3.9–12.7)

## 2021-03-03 PROCEDURE — 82088 ASSAY OF ALDOSTERONE: CPT

## 2021-03-03 PROCEDURE — 25000003 PHARM REV CODE 250: Performed by: HOSPITALIST

## 2021-03-03 PROCEDURE — 84100 ASSAY OF PHOSPHORUS: CPT

## 2021-03-03 PROCEDURE — 99232 SBSQ HOSP IP/OBS MODERATE 35: CPT | Mod: ,,, | Performed by: PSYCHIATRY & NEUROLOGY

## 2021-03-03 PROCEDURE — 25000003 PHARM REV CODE 250: Performed by: INTERNAL MEDICINE

## 2021-03-03 PROCEDURE — 80048 BASIC METABOLIC PNL TOTAL CA: CPT

## 2021-03-03 PROCEDURE — 63600175 PHARM REV CODE 636 W HCPCS: Performed by: HOSPITALIST

## 2021-03-03 PROCEDURE — 84443 ASSAY THYROID STIM HORMONE: CPT

## 2021-03-03 PROCEDURE — 83970 ASSAY OF PARATHORMONE: CPT

## 2021-03-03 PROCEDURE — 82306 VITAMIN D 25 HYDROXY: CPT | Performed by: INTERNAL MEDICINE

## 2021-03-03 PROCEDURE — 97110 THERAPEUTIC EXERCISES: CPT | Mod: CQ

## 2021-03-03 PROCEDURE — 86703 HIV-1/HIV-2 1 RESULT ANTBDY: CPT

## 2021-03-03 PROCEDURE — 36415 COLL VENOUS BLD VENIPUNCTURE: CPT

## 2021-03-03 PROCEDURE — 97116 GAIT TRAINING THERAPY: CPT | Mod: CQ

## 2021-03-03 PROCEDURE — 85025 COMPLETE CBC W/AUTO DIFF WBC: CPT

## 2021-03-03 PROCEDURE — 99232 PR SUBSEQUENT HOSPITAL CARE,LEVL II: ICD-10-PCS | Mod: ,,, | Performed by: PSYCHIATRY & NEUROLOGY

## 2021-03-03 PROCEDURE — 80074 ACUTE HEPATITIS PANEL: CPT | Performed by: INTERNAL MEDICINE

## 2021-03-03 PROCEDURE — 86038 ANTINUCLEAR ANTIBODIES: CPT | Performed by: INTERNAL MEDICINE

## 2021-03-03 RX ORDER — HYDRALAZINE HYDROCHLORIDE 25 MG/1
25 TABLET, FILM COATED ORAL 3 TIMES DAILY
Qty: 270 TABLET | Refills: 3 | Status: SHIPPED | OUTPATIENT
Start: 2021-03-03 | End: 2022-03-03

## 2021-03-03 RX ORDER — ISOSORBIDE DINITRATE 10 MG/1
20 TABLET ORAL 3 TIMES DAILY
Status: DISCONTINUED | OUTPATIENT
Start: 2021-03-03 | End: 2021-03-03 | Stop reason: HOSPADM

## 2021-03-03 RX ORDER — ISOSORBIDE DINITRATE AND HYDRALAZINE HYDROCHLORIDE 37.5; 2 MG/1; MG/1
1 TABLET ORAL 3 TIMES DAILY
Status: DISCONTINUED | OUTPATIENT
Start: 2021-03-03 | End: 2021-03-03

## 2021-03-03 RX ORDER — AMLODIPINE BESYLATE 10 MG/1
10 TABLET ORAL DAILY
Qty: 90 TABLET | Refills: 3 | Status: SHIPPED | OUTPATIENT
Start: 2021-03-04 | End: 2022-03-04

## 2021-03-03 RX ORDER — ISOSORBIDE DINITRATE 20 MG/1
20 TABLET ORAL 3 TIMES DAILY
Qty: 270 TABLET | Refills: 3 | Status: SHIPPED | OUTPATIENT
Start: 2021-03-03 | End: 2022-03-03

## 2021-03-03 RX ORDER — CLOPIDOGREL BISULFATE 75 MG/1
75 TABLET ORAL DAILY
Qty: 21 TABLET | Refills: 0 | Status: SHIPPED | OUTPATIENT
Start: 2021-03-03 | End: 2021-03-24

## 2021-03-03 RX ORDER — NAPROXEN SODIUM 220 MG/1
81 TABLET, FILM COATED ORAL DAILY
Qty: 90 TABLET | Refills: 3 | Status: SHIPPED | OUTPATIENT
Start: 2021-03-04 | End: 2022-03-04

## 2021-03-03 RX ORDER — HYDRALAZINE HYDROCHLORIDE 25 MG/1
25 TABLET, FILM COATED ORAL 3 TIMES DAILY
Status: DISCONTINUED | OUTPATIENT
Start: 2021-03-03 | End: 2021-03-03 | Stop reason: HOSPADM

## 2021-03-03 RX ORDER — ATORVASTATIN CALCIUM 40 MG/1
40 TABLET, FILM COATED ORAL NIGHTLY
Qty: 90 TABLET | Refills: 3 | Status: SHIPPED | OUTPATIENT
Start: 2021-03-03 | End: 2022-03-03

## 2021-03-03 RX ORDER — HYDRALAZINE HYDROCHLORIDE 10 MG/1
10 TABLET, FILM COATED ORAL 3 TIMES DAILY
Status: DISCONTINUED | OUTPATIENT
Start: 2021-03-03 | End: 2021-03-03 | Stop reason: HOSPADM

## 2021-03-03 RX ORDER — METOPROLOL TARTRATE 100 MG/1
100 TABLET ORAL 2 TIMES DAILY
Qty: 180 TABLET | Refills: 3 | Status: SHIPPED | OUTPATIENT
Start: 2021-03-03 | End: 2022-03-03

## 2021-03-03 RX ADMIN — HEPARIN SODIUM 5000 UNITS: 5000 INJECTION INTRAVENOUS; SUBCUTANEOUS at 05:03

## 2021-03-03 RX ADMIN — AMLODIPINE BESYLATE 10 MG: 5 TABLET ORAL at 09:03

## 2021-03-03 RX ADMIN — METOPROLOL TARTRATE 100 MG: 50 TABLET, FILM COATED ORAL at 09:03

## 2021-03-03 RX ADMIN — CLONIDINE HYDROCHLORIDE 0.1 MG: 0.1 TABLET ORAL at 06:03

## 2021-03-03 RX ADMIN — ASPIRIN 81 MG: 81 TABLET, CHEWABLE ORAL at 09:03

## 2021-03-03 RX ADMIN — MUPIROCIN: 20 OINTMENT TOPICAL at 09:03

## 2021-03-04 ENCOUNTER — PATIENT OUTREACH (OUTPATIENT)
Dept: ADMINISTRATIVE | Facility: CLINIC | Age: 46
End: 2021-03-04

## 2021-03-04 DIAGNOSIS — I16.1 HYPERTENSIVE EMERGENCY: Primary | ICD-10-CM

## 2021-03-04 LAB — ANA SER QL IF: NORMAL

## 2021-03-08 LAB
ALDOST SERPL-MCNC: 3.2 NG/DL
ALDOST/RENIN PLAS-RTO: 10.7 RATIO
RENIN PLAS-CCNC: 0.3 NG/ML/HR

## 2021-03-10 ENCOUNTER — CLINICAL SUPPORT (OUTPATIENT)
Dept: REHABILITATION | Facility: HOSPITAL | Age: 46
End: 2021-03-10
Attending: INTERNAL MEDICINE
Payer: MEDICAID

## 2021-03-10 DIAGNOSIS — R47.81 SLURRED SPEECH: ICD-10-CM

## 2021-03-10 PROCEDURE — 92523 SPEECH SOUND LANG COMPREHEN: CPT | Mod: PN | Performed by: SPEECH-LANGUAGE PATHOLOGIST

## 2021-03-10 PROCEDURE — 92610 EVALUATE SWALLOWING FUNCTION: CPT | Mod: PN | Performed by: SPEECH-LANGUAGE PATHOLOGIST

## 2022-02-10 DIAGNOSIS — R53.1 WEAKNESS GENERALIZED: Primary | ICD-10-CM

## 2022-03-03 ENCOUNTER — CLINICAL SUPPORT (OUTPATIENT)
Dept: REHABILITATION | Facility: HOSPITAL | Age: 47
End: 2022-03-03
Attending: NURSE PRACTITIONER
Payer: MEDICAID

## 2022-03-03 DIAGNOSIS — R29.898 UPPER EXTREMITY WEAKNESS: ICD-10-CM

## 2022-03-03 DIAGNOSIS — R26.89 BALANCE PROBLEM: ICD-10-CM

## 2022-03-03 DIAGNOSIS — R29.898 WEAKNESS OF RIGHT LOWER EXTREMITY: ICD-10-CM

## 2022-03-03 PROCEDURE — 97162 PT EVAL MOD COMPLEX 30 MIN: CPT | Mod: PN

## 2022-03-03 PROCEDURE — 97110 THERAPEUTIC EXERCISES: CPT | Mod: PN
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
## 2022-03-30 ENCOUNTER — CLINICAL SUPPORT (OUTPATIENT)
Dept: REHABILITATION | Facility: HOSPITAL | Age: 47
End: 2022-03-30
Attending: NURSE PRACTITIONER
Payer: MEDICAID

## 2022-03-30 DIAGNOSIS — R26.89 BALANCE PROBLEM: ICD-10-CM

## 2022-03-30 DIAGNOSIS — R29.898 WEAKNESS OF RIGHT LOWER EXTREMITY: ICD-10-CM

## 2022-03-30 DIAGNOSIS — R29.898 UPPER EXTREMITY WEAKNESS: Primary | ICD-10-CM

## 2022-03-30 PROCEDURE — 97110 THERAPEUTIC EXERCISES: CPT | Mod: PN
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
## 2022-04-13 ENCOUNTER — TELEPHONE (OUTPATIENT)
Dept: REHABILITATION | Facility: HOSPITAL | Age: 47
End: 2022-04-13
Payer: MEDICAID

## 2022-04-13 NOTE — TELEPHONE ENCOUNTER
Called pt regarding NS appt today. Left voicemail explaining attendance policy as this is his second NS appt in a row. Pt informed that if he does not show for his next appt, he will be taken of f the schedule. Pt provided with next appt time and date.

## 2022-04-20 ENCOUNTER — CLINICAL SUPPORT (OUTPATIENT)
Dept: REHABILITATION | Facility: HOSPITAL | Age: 47
End: 2022-04-20
Attending: NURSE PRACTITIONER
Payer: MEDICAID

## 2022-04-20 DIAGNOSIS — R26.89 BALANCE PROBLEM: ICD-10-CM

## 2022-04-20 DIAGNOSIS — R29.898 UPPER EXTREMITY WEAKNESS: Primary | ICD-10-CM

## 2022-04-20 DIAGNOSIS — R29.898 WEAKNESS OF RIGHT LOWER EXTREMITY: ICD-10-CM

## 2022-04-20 PROCEDURE — 97110 THERAPEUTIC EXERCISES: CPT | Mod: PN

## 2022-04-20 NOTE — PLAN OF CARE
Outpatient Therapy Updated Plan of Care     Visit Date: 4/20/2022  Name: Alfredo Mcgraw  Clinic Number: [id number]    Therapy Diagnosis:   Encounter Diagnoses   Name Primary?    Upper extremity weakness Yes    Weakness of right lower extremity     Balance problem      Physician: Brie Gr FNP-C    Physician Orders: PT Eval and Treat   Medical Diagnosis from Referral: R53.1 (ICD-10-CM) - Weakness generalized  Evaluation Date: 3/3/2022  Authorization Period Expiration: 3/20/2022  Plan of Care Expiration: 3/3/2022 to 4/15/2022  Visit # / Visits authorized: 2/10 +eval    Subjective     Update:   Pt reports:pt reports to PT today after missing a couple of visits. He states he did not know he missed any visits. Pt states that he feels much more stable and stronger.   He was compliant with home exercise program.  Response to previous treatment: feeling stronger  Functional change: no change      Pain: 0/10  Location: bilateral upper extremity/LE      Objective     Update:   Range of Motion/Strength:      U/E MMT Right Left Pain/Dysfunction with Movement   Shoulder Flexion 5/5 5/5     Shoulder Extension 5/5 5/5     Shoulder Abduction 5/5 5/5     Shoulder Adduction 5/5 5/5     Shoulder IR 5/5 5/5     Shoulder ER  @ 0* Abduction 4+/5 5/5     Elbow Flexion  4+/5 5/5     Elbow Extension 5/5 5/5           L/E MMT Right Left Pain/Dysfunction with Movement   Hip Flexion 4+/5 5/5     Hip Extension 4+/5 5/5     Hip Abduction 4+/5 5/5     Hip Adduction 5/5 5/5     Hip IR 5/5 5/5     Hip ER 4+/5 5/5     Knee Flexion 5/5 5/5     Knee Extension 5/5 5/5     Ankle DF 5/5 5/5     Ankle PF NT NT     Ankle Inversion 5/5 5/5     Ankle Eversion 5/5 5/5        Special Tests:   - Clonus bilateral  + Zhou's right upper extremity     30sec S<>S - 13 no upper extremity support.      Balance:      Feet Together EO - no balance deficits  Feet together EC - postural sway  Feet together EO on foam - slight sway  Feet together EC on foam -  postural sway but able to maintain     Functional Gait Assessment:   1. Gait on level surface =  3              (3) Normal: less than 5.5 sec, no A.D., no imbalance, normal gait pattern, deviates< 6in  2. Change in Gait Speed = 3  3. Gait with horizontal head turns  = 3  4. Gait with vertical head turns = 2              (2) Mild impairment: slight change in speed, deviates 6-10 in OR uses A.D.  5. Gait with pivot turns = 3  6. Step over obstacle = 3              (3) Normal: steps over 2 stacked boxes w/o change in speed or LOB  7. Gait with Narrow SATURNINO = 2              (2) Mild impairment: 7-9 steps  8. Gait with eyes closed = 2              (2) Mild impairment: 7.1-9 sec, mild gait deviations, deviates 6-10 in  9. Ambulating Backwards = 2              (2) Mild impairment: uses A.D., slower speed, mild gait deviations, deviates 6-10 in  10. Steps = 2              (2) Mild Impairment: alternating feet, uses rail     Score 25/30      Score:   <22/30 fall risk   <20/30 fall risk in older adults   <18/30 fall risk in Parkinsons     Assessment     Update: Pt presents to PT after missing his last 2 visits. Pt has made improvements in both strength and balance. Deficits continue to be noticeable during ambulation with vision challenges including vertical head turns, tandom balance walking, backwards walking, and walking with eyes closed all of which may lead to injury. Pt would benefit form continued skilled PT to address these balance deficits.     Alfredo Is progressing well towards his goals.   Pt prognosis is Good.     Pt will continue to benefit from skilled outpatient physical therapy to address the deficits listed in the problem list box on initial evaluation, provide pt/family education and to maximize pt's level of independence in the home and community environment.     Pt's spiritual, cultural and educational needs considered and pt agreeable to plan of care and goals.     Anticipated barriers to physical  therapy: 2 previous strokes, rides to therapy.     Goals:  Short Term Goals (3 Weeks):  1. Pt will be compliant with initial HEP to supplement PT in restoring pain free function. - met  2. Pt will improve FGA to 25/30 indicative of reduced risk of falls - met  3. Pt will improve 30 sec S<>S to 14 indicative of improvements in strength - almost met  4. Pt will improve right upper extremity elbow flexion strength to 4+/5 and right hip flexion strength to 4+/5 in order to reduce risk of fall - met     Long Term Goals (6 Weeks):  1. Pt will improve FOTO score to </= 39% limited to decrease perceived limitation with mobility. - Goal to be developed once FOTO is obtained  2. Pt will improve FGA to 28/30 indicative of reduced risk of falls  3. Pt will improve 30 sec S<>S to 16 indicative of improvements in strength  4. Pt will be independent c final home exercise program in order to DC to home program.     Previous Short Term Goals Status:   See above  New Short Term Goals Status:   No new STG  Long Term Goal Status:   continue per initial plan of care.  Reasons for Recertification of Therapy:   Balance deficits    Plan     Updated Certification Period: 4/20/2022 to 5/20/2022   Recommended Treatment Plan: 1 times per week for 4 weeks: Gait Training, Neuromuscular Re-ed, Patient Education, Therapeutic Activities and Therapeutic Exercise,Dry Needling PRN. Treated as appropriate by PTA.    Ana Paula Lyle, PT  4/20/2022      I CERTIFY THE NEED FOR THESE SERVICES FURNISHED UNDER THIS PLAN OF TREATMENT AND WHILE UNDER MY CARE    Physician's comments:        Physician's Signature: ___________________________________________________

## 2022-04-20 NOTE — PROGRESS NOTES
[facility]   Physical Therapy Treatment Note     Name: Alfredo Mcgraw  [facility] Number: [id number]    Therapy Diagnosis:   Encounter Diagnoses   Name Primary?    Upper extremity weakness Yes    Weakness of right lower extremity     Balance problem      Physician: Brie Gr FNP-C    Visit Date: 4/20/2022    Physician Orders: PT Eval and Treat   Medical Diagnosis from Referral: R53.1 (ICD-10-CM) - Weakness generalized  Evaluation Date: 3/3/2022  Authorization Period Expiration: 3/20/2022  Plan of Care Expiration: 3/3/2022 to 4/15/2022  Visit # / Visits authorized: 2/10 +eval  FOTO#:3/5     Time In: 11:30am  Time Out: 12:30pm  Total Billable Time: 60 minutes (4TE)     Precautions: 2 previous strokes    SUBJECTIVE     Pt reports:pt reports to PT today after missing a couple of visits. He states he did not know he missed any visits. Pt states that he feels much more stable and stronger.   He was compliant with home exercise program.  Response to previous treatment: feeling stronger  Functional change: no change     Pain: 0/10  Location: bilateral upper extremity/LE       OBJECTIVE     Objective Measures updated at progress report unless specified.     Range of Motion/Strength:      U/E MMT Right Left Pain/Dysfunction with Movement   Shoulder Flexion 5/5 5/5     Shoulder Extension 5/5 5/5     Shoulder Abduction 5/5 5/5     Shoulder Adduction 5/5 5/5     Shoulder IR 5/5 5/5     Shoulder ER  @ 0* Abduction 4+/5 5/5     Elbow Flexion  4+/5 5/5     Elbow Extension 5/5 5/5           L/E MMT Right Left Pain/Dysfunction with Movement   Hip Flexion 4+/5 5/5     Hip Extension 4+/5 5/5     Hip Abduction 4+/5 5/5     Hip Adduction 5/5 5/5     Hip IR 5/5 5/5     Hip ER 4+/5 5/5     Knee Flexion 5/5 5/5     Knee Extension 5/5 5/5     Ankle DF 5/5 5/5     Ankle PF NT NT     Ankle Inversion 5/5 5/5     Ankle Eversion 5/5 5/5        Special Tests:   - Clonus bilateral  + Zhou's right upper  "extremity     30sec S<>S - 13 no upper extremity support.      Balance:      Feet Together EO - no balance deficits  Feet together EC - postural sway  Feet together EO on foam - slight sway  Feet together EC on foam - postural sway but able to maintain     Functional Gait Assessment:   1. Gait on level surface =  3              (3) Normal: less than 5.5 sec, no A.D., no imbalance, normal gait pattern, deviates< 6in  2. Change in Gait Speed = 3  3. Gait with horizontal head turns  = 3  4. Gait with vertical head turns = 2              (2) Mild impairment: slight change in speed, deviates 6-10 in OR uses A.D.  5. Gait with pivot turns = 3  6. Step over obstacle = 3              (3) Normal: steps over 2 stacked boxes w/o change in speed or LOB  7. Gait with Narrow SATURNINO = 2              (2) Mild impairment: 7-9 steps  8. Gait with eyes closed = 2              (2) Mild impairment: 7.1-9 sec, mild gait deviations, deviates 6-10 in  9. Ambulating Backwards = 2              (2) Mild impairment: uses A.D., slower speed, mild gait deviations, deviates 6-10 in  10. Steps = 2              (2) Mild Impairment: alternating feet, uses rail     Score 25/30      Score:   <22/30 fall risk   <20/30 fall risk in older adults   <18/30 fall risk in Parkinsons       Treatment     Alfredo received the treatments listed below:      Alfredo received therapeutic exercises to develop strength, endurance, posture and core stabilization for 60 minutes including test and measures above:     Nustep Lev 5 for bilateral upper extremity and lower extremity strength - 6min  Recumbent Bike - 8min lv3  Squat taps c middle plyo box  - 2x15  Step up 6" bilateral 2x10 (SBA as pt balance is not great)  Standing hip abd red theraband - bilateral 3x20  Standing hip ext red theraband - bilateral 3x20  bilateral external rotation/iR  seated c red theraband - 2x15  Wall Modified incline pushup - 2x15   Bicep curl and W curl 2x20 5# dumbells    squeeze c blue " digifelx - 3x20 bilateral  Shuttle 4 black cords - 2x20  TRX upright row - 2x10     Patient Education and Home Exercises     Home Exercises Provided and Patient Education Provided     Education provided:   - Pt educated on POC  - Pt educated on anatomy and physiology of current conditions as it relates to signs and symptoms  - Pt educated on HEP     Written Home Exercises Provided: Patient instructed to cont prior HEP. Exercises were reviewed and Alfredo was able to demonstrate them prior to the end of the session.  Alfredo demonstrated good  understanding of the education provided. See EMR under Patient Instructions for exercises provided during therapy sessions    ASSESSMENT     See updated plan of care for details.     PLAN     See updated plan of care for details    Ana Paula Lyle, PT, DPT, OCS, Cert. DN

## 2022-04-27 ENCOUNTER — CLINICAL SUPPORT (OUTPATIENT)
Dept: REHABILITATION | Facility: HOSPITAL | Age: 47
End: 2022-04-27
Attending: NURSE PRACTITIONER
Payer: MEDICAID

## 2022-04-27 DIAGNOSIS — R29.898 UPPER EXTREMITY WEAKNESS: Primary | ICD-10-CM

## 2022-04-27 DIAGNOSIS — R26.89 BALANCE PROBLEM: ICD-10-CM

## 2022-04-27 DIAGNOSIS — R29.898 WEAKNESS OF RIGHT LOWER EXTREMITY: ICD-10-CM

## 2022-04-27 PROCEDURE — 97110 THERAPEUTIC EXERCISES: CPT | Mod: PN

## 2022-04-27 NOTE — PROGRESS NOTES
"[facility]   Physical Therapy Treatment Note     Name: Alfredo Mcgraw  Clinic Number: [id number]    Therapy Diagnosis:   Encounter Diagnoses   Name Primary?    Upper extremity weakness Yes    Weakness of right lower extremity     Balance problem      Physician: Brie Gr FNP-C    Visit Date: 4/27/2022    Physician Orders: PT Eval and Treat   Medical Diagnosis from Referral: R53.1 (ICD-10-CM) - Weakness generalized  Evaluation Date: 3/3/2022  Authorization Period Expiration: 3/20/2022  Plan of Care Expiration: 3/3/2022 to 4/15/2022  Visit # / Visits authorized: 3/10 +eval  FOTO#:4/5     Time In: 11:30am  Time Out: 12:25pm  Total Billable Time: 55 minutes (4TE)     Precautions: 2 previous strokes    SUBJECTIVE     Pt reports:Pt reports that he has a lot on his mind right now because his son was arrested in [place] recently. His body feels ok right now though.   He was compliant with home exercise program.  Response to previous treatment: feeling stronger  Functional change: no change     Pain: 0/10  Location: bilateral upper extremity/LE       OBJECTIVE     Objective Measures updated at progress report unless specified.     Treatment     Alfredo received the treatments listed below:    Bold = performed     Alfredo received therapeutic exercises to develop strength, endurance, posture and core stabilization for 60 minutes including test and measures above:     Nustep Lev 5 for bilateral upper extremity and lower extremity strength - 6min  Treadmill 1.5mph, 6incline - 6min  Recumbent Bike - 8min lv3  Squat taps c middle plyo box  - 2x15 10#  Step up 12" bilateral 2x10 5# - SBA  Lateral monster walk red theraband - 2x25ft  Standing hip abd red theraband - bilateral 3x20  Standing hip ext red theraband - bilateral 3x20  bilateral external rotation/iR  seated c red theraband - 2x15  Bench Modified incline pushup - 2x12   Bicep curl and W curl 2x20 7# dumbells    squeeze c blue " digifelx - 3x20 bilateral  Shuttle 4 black cords double leg - 2x15  Shuttle 2.5 black cords single leg - 2x15  TRX upright row - 2x15  Matrix hip abd machine - 2x15 40#  Matrix hip add machine - 2x15 45#  Matrix knee ext machine 2x15 - 35#     Patient Education and Home Exercises     Home Exercises Provided and Patient Education Provided     Education provided:   - Pt educated on POC  - Pt educated on anatomy and physiology of current conditions as it relates to signs and symptoms  - Pt educated on HEP     Written Home Exercises Provided: Patient instructed to cont prior HEP. Exercises were reviewed and Alfredo was able to demonstrate them prior to the end of the session.  Alfredo demonstrated good  understanding of the education provided. See EMR under Patient Instructions for exercises provided during therapy sessions    ASSESSMENT     Pt reports to PT today with other things on his mind. He was advanced in most activities. right lower extremity continues to show weakness compared to right. Fasciculations noted with exertion today. Pt taken through more gym equipment so that he is prepared for DC next visit.      Alfredo Is progressing well towards his goals.   Pt prognosis is Good.      Pt will continue to benefit from skilled outpatient physical therapy to address the deficits listed in the problem list box on initial evaluation, provide pt/family education and to maximize pt's level of independence in the home and community environment.      Pt's spiritual, cultural and educational needs considered and pt agreeable to plan of care and goals.     Anticipated barriers to physical therapy: 2 previous strokes, rides to therapy.      Goals:  Short Term Goals (3 Weeks):  1. Pt will be compliant with initial HEP to supplement PT in restoring pain free function. - met  2. Pt will improve FGA to 25/30 indicative of reduced risk of falls - met  3. Pt will improve 30 sec S<>S to 14 indicative of improvements in strength -  almost met  4. Pt will improve right upper extremity elbow flexion strength to 4+/5 and right hip flexion strength to 4+/5 in order to reduce risk of fall - met     Long Term Goals (6 Weeks):  1. Pt will improve FOTO score to </= 39% limited to decrease perceived limitation with mobility. - Goal to be developed once FOTO is obtained  2. Pt will improve FGA to 28/30 indicative of reduced risk of falls  3. Pt will improve 30 sec S<>S to 16 indicative of improvements in strength  4. Pt will be independent c final home exercise program in order to DC to home program.     PLAN     DC NEXT Visit.     Ana Paula Lyle, PT, DPT, OCS, Cert. DN

## 2022-05-04 ENCOUNTER — CLINICAL SUPPORT (OUTPATIENT)
Dept: REHABILITATION | Facility: HOSPITAL | Age: 47
End: 2022-05-04
Attending: NURSE PRACTITIONER
Payer: MEDICAID

## 2022-05-04 DIAGNOSIS — R29.898 UPPER EXTREMITY WEAKNESS: Primary | ICD-10-CM

## 2022-05-04 DIAGNOSIS — R26.89 BALANCE PROBLEM: ICD-10-CM

## 2022-05-04 DIAGNOSIS — R29.898 WEAKNESS OF RIGHT LOWER EXTREMITY: ICD-10-CM

## 2022-05-04 PROCEDURE — 97110 THERAPEUTIC EXERCISES: CPT | Mod: PN

## 2022-05-04 NOTE — PROGRESS NOTES
[facility]   Physical Therapy Treatment Note/Discharge Summary    Name: Alfredo Mcgraw  Clinic Number: [id number]    Therapy Diagnosis:   Encounter Diagnoses   Name Primary?    Upper extremity weakness Yes    Weakness of right lower extremity     Balance problem      Physician: Brie Gr FNP-C    Visit Date: 5/4/2022    Physician Orders: PT Eval and Treat   Medical Diagnosis from Referral: R53.1 (ICD-10-CM) - Weakness generalized  Evaluation Date: 3/3/2022  Authorization Period Expiration: 3/20/2022  Plan of Care Expiration: 4/20/2022 to 5/20/2022   Visit # / Visits authorized: 4/10 +eval  FOTO#:5/5 - obtained today     Time In: 11:50am  Time Out: 12:20pm  Total Billable Time: 30 minutes (3TE)     Precautions: 2 previous strokes    SUBJECTIVE     Pt reports:Pt reports that he tried doing his balance exercises outside and thinks he might fall. He feels a lot stronger though and no longer has trouble with stairs.   He was compliant with home exercise program.  Response to previous treatment: feeling stronger  Functional change: no change     Pain: 0/10  Location: bilateral upper extremity/LE       OBJECTIVE     Objective Measures updated at progress report unless specified.     Range of Motion/Strength:      U/E MMT Right Left Pain/Dysfunction with Movement   Shoulder Flexion 5/5 5/5     Shoulder Extension 5/5 5/5     Shoulder Abduction 5/5 5/5     Shoulder Adduction 5/5 5/5     Shoulder IR 5/5 5/5     Shoulder ER  @ 0* Abduction 4+/5 5/5     Elbow Flexion  4+/5 5/5     Elbow Extension 5/5 5/5           L/E MMT Right Left Pain/Dysfunction with Movement   Hip Flexion 5/5 5/5     Hip Extension 5/5 5/5     Hip Abduction 5/5 5/5     Hip Adduction 5/5 5/5     Hip IR 5/5 5/5     Hip ER 5/5 5/5     Knee Flexion 5/5 5/5     Knee Extension 5/5 5/5     Ankle DF 5/5 5/5     Ankle PF NT NT     Ankle Inversion 5/5 5/5     Ankle Eversion 5/5 5/5        Special Tests:   - Clonus bilateral  +  "Zhou's right upper extremity     30sec S<>S - 13 no upper extremity support.      Balance:      Feet Together EO - no balance deficits  Feet together EC - postural sway  Feet together EO on foam - slight sway  Feet together EC on foam - postural sway but able to maintain     Functional Gait Assessment:   1. Gait on level surface =  3              (3) Normal: less than 5.5 sec, no A.D., no imbalance, normal gait pattern, deviates< 6in  2. Change in Gait Speed = 3  3. Gait with horizontal head turns  = 3  4. Gait with vertical head turns = 2              (2) Mild impairment: slight change in speed, deviates 6-10 in OR uses A.D.  5. Gait with pivot turns = 3  6. Step over obstacle = 3              (3) Normal: steps over 2 stacked boxes w/o change in speed or LOB  7. Gait with Narrow SATURNINO = 2              (2) Mild impairment: 7-9 steps  8. Gait with eyes closed = 2              (2) Mild impairment: 7.1-9 sec, mild gait deviations, deviates 6-10 in  9. Ambulating Backwards = 2              (2) Mild impairment: uses A.D., slower speed, mild gait deviations, deviates 6-10 in  10. Steps = 3     Score 26/30      Score:   <22/30 fall risk   <20/30 fall risk in older adults   <18/30 fall risk in Parkinsons       Treatment     Alfredo received the treatments listed below:    Bold = performed     Alfredo received therapeutic exercises to develop strength, endurance, posture and core stabilization for 30 minutes including test and measures above:     Nustep Lev 5 for bilateral upper extremity and lower extremity strength - 6min  Treadmill 1.5mph, 6incline - 6min  Recumbent Bike - 8min lv3  Squat taps c middle [employer] box  - 2x15 10#  Step up 12" bilateral 2x10 5# - SBA  Lateral monster walk red theraband - 2x25ft  Standing hip abd red theraband - bilateral 3x20  Standing hip ext red theraband - bilateral 3x20  bilateral external rotation/iR  seated c red theraband - 2x15  Bench Modified incline pushup - 2x12   Bicep curl and W " curl 2x20 7# dumbells    squeeze c blue digifelx - 3x20 bilateral  Shuttle 4 black cords double leg - 2x15  Shuttle 2.5 black cords single leg - 2x15  TRX upright row - 2x15  Matrix hip abd machine - 2x15 40#  Matrix hip add machine - 2x15 45#  Matrix knee ext machine 2x15 - 35#     Patient Education and Home Exercises     Home Exercises Provided and Patient Education Provided     Education provided:   - Pt educated on POC  - Pt educated on anatomy and physiology of current conditions as it relates to signs and symptoms  - Pt educated on HEP     Written Home Exercises Provided: Patient instructed to cont prior HEP. Exercises were reviewed and Alfredo was able to demonstrate them prior to the end of the session.  Alfredo demonstrated good  understanding of the education provided. See EMR under Patient Instructions for exercises provided during therapy sessions    ASSESSMENT   Pt reports to PT today 20 min late. Pt has made improvements in strength and now longer has difficulty adscending or descending stairs. Pt was provided with balance exercises to perform at home last visit but has not been able to perform them at home. Pt is at a point were skilled PT is no longer required. Pt to DC to home exercise program.      Alfredo Is progressing well towards his goals.   Pt prognosis is Good.      Pt's spiritual, cultural and educational needs considered and pt agreeable to plan of care and goals.     Anticipated barriers to physical therapy: 2 previous strokes, rides to therapy.      Goals:  Short Term Goals (3 Weeks):  1. Pt will be compliant with initial HEP to supplement PT in restoring pain free function. - met  2. Pt will improve FGA to 25/30 indicative of reduced risk of falls - met  3. Pt will improve 30 sec S<>S to 14 indicative of improvements in strength - almost met  4. Pt will improve right upper extremity elbow flexion strength to 4+/5 and right hip flexion strength to 4+/5 in order to reduce risk of fall  - met     Long Term Goals (6 Weeks):  1. Pt will improve FOTO score to </= 39% limited to decrease perceived limitation with mobility. - almost met now at 41%  2. Pt will improve FGA to 28/30 indicative of reduced risk of falls - almost met now at 26  3. Pt will improve 30 sec S<>S to 16 indicative of improvements in strength - not met  4. Pt will be independent c final home exercise program in order to DC to home program. - met    PLAN     DC PT    Ana Paula Lyle, PT, DPT, OCS, Cert. DN

## 2022-09-27 ENCOUNTER — HOSPITAL ENCOUNTER (EMERGENCY)
Facility: HOSPITAL | Age: 47
Discharge: HOME | End: 2022-09-27
Attending: EMERGENCY MEDICINE
Payer: MEDICAID

## 2022-09-27 VITALS
DIASTOLIC BLOOD PRESSURE: 79 MMHG | RESPIRATION RATE: 18 BRPM | HEART RATE: 67 BPM | SYSTOLIC BLOOD PRESSURE: 119 MMHG | HEIGHT: 68 IN | OXYGEN SATURATION: 99 % | TEMPERATURE: 98 F | WEIGHT: 156 LBS | BODY MASS INDEX: 23.64 KG/M2

## 2022-09-27 DIAGNOSIS — E86.0 DEHYDRATION: ICD-10-CM

## 2022-09-27 DIAGNOSIS — R42 ORTHOSTATIC DIZZINESS: Primary | ICD-10-CM

## 2022-09-27 LAB
ALBUMIN SERPL BCP-MCNC: 4.3 G/DL (ref 3.5–5.2)
ALP SERPL-CCNC: 153 U/L (ref 55–135)
ALT SERPL W/O P-5'-P-CCNC: 23 U/L (ref 10–44)
ANION GAP SERPL CALC-SCNC: 6 MMOL/L (ref 8–16)
AST SERPL-CCNC: 18 U/L (ref 10–40)
BASOPHILS # BLD AUTO: 0.04 K/UL (ref 0–0.2)
BASOPHILS NFR BLD: 0.5 % (ref 0–1.9)
BILIRUB SERPL-MCNC: 0.4 MG/DL (ref 0.1–1)
BUN SERPL-MCNC: 18 MG/DL (ref 6–20)
CALCIUM SERPL-MCNC: 10.1 MG/DL (ref 8.7–10.5)
CHLORIDE SERPL-SCNC: 104 MMOL/L (ref 95–110)
CO2 SERPL-SCNC: 30 MMOL/L (ref 23–29)
CREAT SERPL-MCNC: 1.7 MG/DL (ref 0.5–1.4)
DIFFERENTIAL METHOD: ABNORMAL
EOSINOPHIL # BLD AUTO: 0 K/UL (ref 0–0.5)
EOSINOPHIL NFR BLD: 0.5 % (ref 0–8)
ERYTHROCYTE [DISTWIDTH] IN BLOOD BY AUTOMATED COUNT: 14.2 % (ref 11.5–14.5)
EST. GFR  (NO RACE VARIABLE): 50 ML/MIN/1.73 M^2
GLUCOSE SERPL-MCNC: 90 MG/DL (ref 70–110)
HCT VFR BLD AUTO: 39.4 % (ref 40–54)
HGB BLD-MCNC: 13.4 G/DL (ref 14–18)
IMM GRANULOCYTES # BLD AUTO: 0.01 K/UL (ref 0–0.04)
IMM GRANULOCYTES NFR BLD AUTO: 0.1 % (ref 0–0.5)
LYMPHOCYTES # BLD AUTO: 1 K/UL (ref 1–4.8)
LYMPHOCYTES NFR BLD: 12.9 % (ref 18–48)
MCH RBC QN AUTO: 29.1 PG (ref 27–31)
MCHC RBC AUTO-ENTMCNC: 34 G/DL (ref 32–36)
MCV RBC AUTO: 86 FL (ref 82–98)
MONOCYTES # BLD AUTO: 0.4 K/UL (ref 0.3–1)
MONOCYTES NFR BLD: 4.3 % (ref 4–15)
NEUTROPHILS # BLD AUTO: 6.6 K/UL (ref 1.8–7.7)
NEUTROPHILS NFR BLD: 81.7 % (ref 38–73)
NRBC BLD-RTO: 0 /100 WBC
PLATELET # BLD AUTO: 248 K/UL (ref 150–450)
PMV BLD AUTO: 9.2 FL (ref 9.2–12.9)
POCT GLUCOSE: 83 MG/DL (ref 70–110)
POTASSIUM SERPL-SCNC: 4.6 MMOL/L (ref 3.5–5.1)
PROT SERPL-MCNC: 8.6 G/DL (ref 6–8.4)
RBC # BLD AUTO: 4.61 M/UL (ref 4.6–6.2)
SODIUM SERPL-SCNC: 140 MMOL/L (ref 136–145)
WBC # BLD AUTO: 8.08 K/UL (ref 3.9–12.7)

## 2022-09-27 PROCEDURE — 93010 ELECTROCARDIOGRAM REPORT: CPT | Mod: ,,, | Performed by: INTERNAL MEDICINE

## 2022-09-27 PROCEDURE — 80053 COMPREHEN METABOLIC PANEL: CPT | Performed by: EMERGENCY MEDICINE

## 2022-09-27 PROCEDURE — 25000003 PHARM REV CODE 250: Performed by: EMERGENCY MEDICINE

## 2022-09-27 PROCEDURE — 99284 EMERGENCY DEPT VISIT MOD MDM: CPT | Mod: 25

## 2022-09-27 PROCEDURE — 82962 GLUCOSE BLOOD TEST: CPT

## 2022-09-27 PROCEDURE — 93010 EKG 12-LEAD: ICD-10-PCS | Mod: ,,, | Performed by: INTERNAL MEDICINE

## 2022-09-27 PROCEDURE — 96360 HYDRATION IV INFUSION INIT: CPT

## 2022-09-27 PROCEDURE — 93005 ELECTROCARDIOGRAM TRACING: CPT

## 2022-09-27 PROCEDURE — 85025 COMPLETE CBC W/AUTO DIFF WBC: CPT | Performed by: EMERGENCY MEDICINE

## 2022-09-27 RX ADMIN — SODIUM CHLORIDE 1000 ML: 0.9 INJECTION, SOLUTION INTRAVENOUS at 02:09

## 2022-09-27 NOTE — DISCHARGE INSTRUCTIONS
Thank you for coming to our Emergency Department today. It is important to remember that some problems or medical conditions are difficult to diagnose and may not be found during your Emergency Department visit.     Be sure to follow up with your primary care doctor and review all labs/imaging/tests that were performed during your ER visit with them. Some labs/imaging/tests may be outside of the normal range, and require non-emergent follow-up and/or further investigation to help diagnose/exclude/prevent complications or other potentially serious medical conditions that were not discussed or addressed during your ER visit.    If you do not have a primary care doctor, you may contact the one listed on your discharge paperwork or you may also call the [facility] Appointment Desk at [phone] to schedule an appointment and establish care with one. It is important to your health that you have a primary care doctor.    Please take all medications as directed. All medications may potentially have side-effects and it is impossible to predict which medications may give you side-effects or what side-effects (if any) they will give you.. If you feel that you are having a negative effect or side-effect of any medication you should immediately stop taking them and seek medical attention. If you feel that you are having a life-threatening reaction call 911.    Return to the ER with any questions/concerns, new/concerning symptoms, worsening or failure to improve.     Do not drive, swim, climb to height, take a bath, operate heavy machinery, drink alcohol or take potentially sedating medications, sign any legal documents or make any important decisions for 24 hours if you have received any pain medications, sedatives or mood altering drugs during your ER visit or within 24 hours of taking them if they have been prescribed to you.     You can find additional resources for Dentists, hearing aids, durable medical equipment,  low cost pharmacies and other resources at [email or web address]    BELOW THIS LINE ONLY APPLIES IF YOU HAVE A COVID TEST PENDING OR IF YOU HAVE BEEN DIAGNOSED WITH COVID:  Please access [facility] to review the results of your test. Until the results of your COVID test return, you should isolate yourself so as not to potentially spread illness to others.   If your COVID test returns positive, you should isolate yourself so as not to spread illness to others. After five full days, if you are feeling better and you have not had fever for 24 hours, you can return to your typical daily activities, but you must wear a mask around others for an additional 5 days.   If your COVID test returns negative and you are either unvaccinated or more than six months out from your two-dose vaccine and are not yet boosted, you should still quarantine for 5 full days followed by strict mask use for an additional 5 full days.   If your COVID test returns negative and you have received your 2-dose initial vaccine as well as a booster, you should continue strict mask use for 10 full days after the exposure.  For all those exposed, best practice includes a test at day 5 after the exposure. This can be a home test or a test through one of the many testing centers throughout our community.   Masking is always advised to limit the spread of COVID. Cdc.gov is an excellent site to obtain the latest up to date recommendations regarding COVID and COVID testing.     CDC Testing and Quarantine Guidelines for patients with exposure to a known-positive COVID-19 person:  A close exposure is defined as anyone who has had an exposure (masked or unmasked) to a known COVID -19 positive person within 6 feet of someone for a cumulative total of 15 minutes or more over a 24-hour period.   Vaccinated and/or if you recently had a positive covid test within 90 days do NOT need to quarantine after contact with someone who had COVID-19 unless you develop  symptoms.   Fully vaccinated people who have not had a positive test within 90 days, should get tested 3-5 days after their exposure, even if they don't have symptoms and wear a mask indoors in public for 14 days following exposure or until their test result is negative.      Unvaccinated and/or NOT had a positive test within 90 days and meet close exposure  You are required by CDC guidelines to quarantine for at least 5 days from time of exposure followed by 5 days of strict masking. It is recommended, but not required to test after 5 days, unless you develop symptoms, in which case you should test at that time.  If you get tested after 5 days and your test is positive, your 5 day period of isolation starts the day of the positive test.    If your exposure does not meet the above definition, you can return to your normal daily activities to include social distancing, wearing a mask and frequent handwashing.      Here is a link to guidance from the CDC:  https://www.cdc.gov/media/releases/2021/s1227-isolation-quarantine-guidance.html      [facility]  Health Testing Sites:  https://ldh.[place].gov/page/3934      [facility] website with testing locations and guidance:  [email or web address]
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
